# Patient Record
Sex: MALE | Race: WHITE | NOT HISPANIC OR LATINO | Employment: FULL TIME | URBAN - METROPOLITAN AREA
[De-identification: names, ages, dates, MRNs, and addresses within clinical notes are randomized per-mention and may not be internally consistent; named-entity substitution may affect disease eponyms.]

---

## 2025-01-02 ENCOUNTER — TELEPHONE (OUTPATIENT)
Dept: PRIMARY CARE | Facility: CLINIC | Age: 60
End: 2025-01-02
Payer: COMMERCIAL

## 2025-01-02 DIAGNOSIS — E78.00 HYPERCHOLESTEROLEMIA: ICD-10-CM

## 2025-01-02 DIAGNOSIS — I10 HYPERTENSION, UNSPECIFIED TYPE: ICD-10-CM

## 2025-01-03 PROBLEM — E78.00 HYPERCHOLESTEROLEMIA: Status: ACTIVE | Noted: 2020-09-16

## 2025-01-03 PROBLEM — I10 HYPERTENSION: Status: ACTIVE | Noted: 2019-07-15

## 2025-01-08 ENCOUNTER — LAB (OUTPATIENT)
Dept: LAB | Facility: LAB | Age: 60
End: 2025-01-08
Payer: COMMERCIAL

## 2025-01-08 ENCOUNTER — APPOINTMENT (OUTPATIENT)
Dept: PRIMARY CARE | Facility: CLINIC | Age: 60
End: 2025-01-08
Payer: COMMERCIAL

## 2025-01-08 VITALS
BODY MASS INDEX: 34.21 KG/M2 | DIASTOLIC BLOOD PRESSURE: 84 MMHG | SYSTOLIC BLOOD PRESSURE: 142 MMHG | HEIGHT: 69 IN | WEIGHT: 231 LBS

## 2025-01-08 DIAGNOSIS — R42 DIZZINESS: Primary | ICD-10-CM

## 2025-01-08 DIAGNOSIS — R73.03 PRE-DIABETES: ICD-10-CM

## 2025-01-08 DIAGNOSIS — R07.9 CHEST PAIN, UNSPECIFIED TYPE: ICD-10-CM

## 2025-01-08 DIAGNOSIS — R63.5 WEIGHT GAIN: ICD-10-CM

## 2025-01-08 DIAGNOSIS — E78.00 HYPERCHOLESTEROLEMIA: ICD-10-CM

## 2025-01-08 DIAGNOSIS — I10 HYPERTENSION, UNSPECIFIED TYPE: ICD-10-CM

## 2025-01-08 LAB
ALBUMIN SERPL BCP-MCNC: 4.8 G/DL (ref 3.4–5)
ALP SERPL-CCNC: 70 U/L (ref 33–120)
ALT SERPL W P-5'-P-CCNC: 19 U/L (ref 10–52)
ANION GAP SERPL CALCULATED.3IONS-SCNC: 9 MMOL/L (ref 10–20)
APPEARANCE UR: CLEAR
AST SERPL W P-5'-P-CCNC: 19 U/L (ref 9–39)
BILIRUB SERPL-MCNC: 0.5 MG/DL (ref 0–1.2)
BILIRUB UR STRIP.AUTO-MCNC: NEGATIVE MG/DL
BUN SERPL-MCNC: 14 MG/DL (ref 6–23)
CALCIUM SERPL-MCNC: 9.4 MG/DL (ref 8.6–10.3)
CHLORIDE SERPL-SCNC: 104 MMOL/L (ref 98–107)
CHOLEST SERPL-MCNC: 214 MG/DL (ref 0–199)
CHOLEST/HDLC SERPL: 4 {RATIO}
CO2 SERPL-SCNC: 29 MMOL/L (ref 21–32)
COLOR UR: NORMAL
CREAT SERPL-MCNC: 1.07 MG/DL (ref 0.5–1.3)
EGFRCR SERPLBLD CKD-EPI 2021: 80 ML/MIN/1.73M*2
ERYTHROCYTE [DISTWIDTH] IN BLOOD BY AUTOMATED COUNT: 14.9 % (ref 11.5–14.5)
GLUCOSE SERPL-MCNC: 92 MG/DL (ref 74–99)
GLUCOSE UR STRIP.AUTO-MCNC: NORMAL MG/DL
HCT VFR BLD AUTO: 40.2 % (ref 41–52)
HDLC SERPL-MCNC: 52.9 MG/DL
HGB BLD-MCNC: 12.8 G/DL (ref 13.5–17.5)
KETONES UR STRIP.AUTO-MCNC: NEGATIVE MG/DL
LDLC SERPL CALC-MCNC: 142 MG/DL
LEUKOCYTE ESTERASE UR QL STRIP.AUTO: NEGATIVE
MCH RBC QN AUTO: 27.2 PG (ref 26–34)
MCHC RBC AUTO-ENTMCNC: 31.8 G/DL (ref 32–36)
MCV RBC AUTO: 85 FL (ref 80–100)
NITRITE UR QL STRIP.AUTO: NEGATIVE
NON HDL CHOLESTEROL: 161 MG/DL (ref 0–149)
NRBC BLD-RTO: 0 /100 WBCS (ref 0–0)
PH UR STRIP.AUTO: 5.5 [PH]
PLATELET # BLD AUTO: 295 X10*3/UL (ref 150–450)
POTASSIUM SERPL-SCNC: 4.2 MMOL/L (ref 3.5–5.3)
PROT SERPL-MCNC: 7.2 G/DL (ref 6.4–8.2)
PROT UR STRIP.AUTO-MCNC: NEGATIVE MG/DL
RBC # BLD AUTO: 4.71 X10*6/UL (ref 4.5–5.9)
RBC # UR STRIP.AUTO: NEGATIVE /UL
SODIUM SERPL-SCNC: 138 MMOL/L (ref 136–145)
SP GR UR STRIP.AUTO: 1.02
TRIGL SERPL-MCNC: 96 MG/DL (ref 0–149)
UROBILINOGEN UR STRIP.AUTO-MCNC: NORMAL MG/DL
VLDL: 19 MG/DL (ref 0–40)
WBC # BLD AUTO: 6 X10*3/UL (ref 4.4–11.3)

## 2025-01-08 PROCEDURE — 83036 HEMOGLOBIN GLYCOSYLATED A1C: CPT

## 2025-01-08 PROCEDURE — 3077F SYST BP >= 140 MM HG: CPT | Performed by: INTERNAL MEDICINE

## 2025-01-08 PROCEDURE — 99204 OFFICE O/P NEW MOD 45 MIN: CPT | Performed by: INTERNAL MEDICINE

## 2025-01-08 PROCEDURE — 80061 LIPID PANEL: CPT

## 2025-01-08 PROCEDURE — 84443 ASSAY THYROID STIM HORMONE: CPT

## 2025-01-08 PROCEDURE — 3079F DIAST BP 80-89 MM HG: CPT | Performed by: INTERNAL MEDICINE

## 2025-01-08 PROCEDURE — 81003 URINALYSIS AUTO W/O SCOPE: CPT

## 2025-01-08 PROCEDURE — 3008F BODY MASS INDEX DOCD: CPT | Performed by: INTERNAL MEDICINE

## 2025-01-08 PROCEDURE — 85027 COMPLETE CBC AUTOMATED: CPT

## 2025-01-08 PROCEDURE — 80053 COMPREHEN METABOLIC PANEL: CPT

## 2025-01-08 RX ORDER — IBUPROFEN 600 MG/1
TABLET ORAL
COMMUNITY
Start: 2022-06-28

## 2025-01-08 ASSESSMENT — ENCOUNTER SYMPTOMS
DEPRESSION: 0
LOSS OF SENSATION IN FEET: 0
OCCASIONAL FEELINGS OF UNSTEADINESS: 0

## 2025-01-09 LAB
EST. AVERAGE GLUCOSE BLD GHB EST-MCNC: 111 MG/DL
HBA1C MFR BLD: 5.5 %
TSH SERPL-ACNC: 2.06 MIU/L (ref 0.44–3.98)

## 2025-01-09 NOTE — PROGRESS NOTES
"Subjective   Patient ID: Golden Goldman is a 59 y.o. male who presents for Establish Care.    1. This 59-year-old young man today came to my office first time.  He told me that he is dizzy on and off.  There is a positional component to it.  If he stands up quickly, he also gets dizzy.  2. He gets chest pain, palpitation at times.  He thinks he has a cardiac arrhythmia, misses the beat.  He is concerned.  He works very hard as a marcial.    I have personally reviewed the patient's Past Medical History, Medications, Allergies, Social History, and Family History in the EMR.    IMMUNIZATION: I will check tetanus shot.    OPERATION: Lipoma fat lump removed, it came back.    Review of Systems   All other systems reviewed and are negative.  The patient never had a heart attack.  No stroke.  No diabetes.  No cancer.    Objective   /84   Ht 1.753 m (5' 9\")   Wt 105 kg (231 lb)   BMI 34.11 kg/m²     Physical Exam  Vitals reviewed.   HENT:      Right Ear: Tympanic membrane, ear canal and external ear normal.      Left Ear: Tympanic membrane, ear canal and external ear normal.   Eyes:      General: No scleral icterus.     Pupils: Pupils are equal, round, and reactive to light.   Neck:      Vascular: No carotid bruit.   Cardiovascular:      Heart sounds: Normal heart sounds, S1 normal and S2 normal. No murmur heard.     No friction rub.   Pulmonary:      Effort: Pulmonary effort is normal.      Breath sounds: Normal breath sounds and air entry.   Abdominal:      Palpations: There is no hepatomegaly, splenomegaly or mass.   Musculoskeletal:         General: No swelling or deformity. Normal range of motion.      Cervical back: Neck supple.      Right lower leg: No edema.      Left lower leg: No edema.   Lymphadenopathy:      Cervical: No cervical adenopathy.      Upper Body:      Right upper body: No axillary adenopathy.      Left upper body: No axillary adenopathy.      Lower Body: No right inguinal adenopathy. No " left inguinal adenopathy.   Neurological:      Mental Status: He is oriented to person, place, and time.      Cranial Nerves: Cranial nerves 2-12 are intact. No cranial nerve deficit.      Sensory: No sensory deficit.      Motor: Motor function is intact. No weakness.      Gait: Gait is intact.      Deep Tendon Reflexes: Reflexes normal.      Comments: Some Hallpike's maneuver positive.   Psychiatric:         Mood and Affect: Mood normal. Mood is not anxious or depressed. Affect is not angry.         Behavior: Behavior is not agitated.         Thought Content: Thought content normal.         Judgment: Judgment normal.     LAB WORK: Laboratory testing discussed.    Assessment/Plan   Problem List Items Addressed This Visit             ICD-10-CM       Cardiac and Vasculature    Hypercholesterolemia E78.00    Relevant Orders    Comprehensive Metabolic Panel (Completed)    Lipid Panel (Completed)    Hypertension I10    Relevant Orders    CBC (Completed)    Thyroid Stimulating Hormone    Urinalysis with Reflex Microscopic (Completed)     Other Visit Diagnoses         Codes    Dizziness    -  Primary R42    Chest pain, unspecified type     R07.9    Relevant Orders    Holter or Event Cardiac Monitor    Transthoracic Echo Complete    Pre-diabetes     R73.03    Relevant Orders    Hemoglobin A1C    Weight gain     R63.5        1. Dizziness, vestibular disease.  Monitor.  2. Weight gain.  Complete blood work ordered.  3. Chest pain, palpitation.  I ordered thyroid, blood work.  Holter and echo ordered.  4. BPH.  PSA.  5. Family history of type 2 diabetes and hypertension.  Blood work ordered.  6. I shall see him back in a couple of weeks for a physical.  7. Immunization.  We will monitor tetanus shot.  8. I will continue to follow.  9. Follow-up with me in two weeks.    Scribe Attestation  By signing my name below, Felecia RUSSELL Scribe attest that this documentation has been prepared under the direction and in the presence of  Boris Lacey MD.     All medical record entries made by the scribe were personally dictated by me I have reviewed the chart and agree the record accurately reflects my personal performance of his history physical examination and management

## 2025-01-16 ENCOUNTER — HOSPITAL ENCOUNTER (OUTPATIENT)
Dept: CARDIOLOGY | Facility: CLINIC | Age: 60
Discharge: HOME | End: 2025-01-16
Payer: COMMERCIAL

## 2025-01-16 DIAGNOSIS — R07.9 CHEST PAIN, UNSPECIFIED TYPE: ICD-10-CM

## 2025-01-16 LAB
AORTIC VALVE PEAK VELOCITY: 1.53 M/S
AV PEAK GRADIENT: 9 MMHG
AVA (PEAK VEL): 3.12 CM2
EJECTION FRACTION APICAL 4 CHAMBER: 66.5
EJECTION FRACTION: 58 %
LEFT ATRIUM VOLUME AREA LENGTH INDEX BSA: 27.7 ML/M2
LEFT VENTRICLE INTERNAL DIMENSION DIASTOLE: 5.1 CM (ref 3.5–6)
LEFT VENTRICULAR OUTFLOW TRACT DIAMETER: 2.2 CM
MITRAL VALVE E/A RATIO: 0.83
RIGHT VENTRICLE FREE WALL PEAK S': 15 CM/S
RIGHT VENTRICLE PEAK SYSTOLIC PRESSURE: 22.4 MMHG
TRICUSPID ANNULAR PLANE SYSTOLIC EXCURSION: 2.1 CM

## 2025-01-16 PROCEDURE — 93246 EXT ECG>7D<15D RECORDING: CPT

## 2025-01-16 PROCEDURE — 93306 TTE W/DOPPLER COMPLETE: CPT | Performed by: INTERNAL MEDICINE

## 2025-01-16 PROCEDURE — 93306 TTE W/DOPPLER COMPLETE: CPT

## 2025-01-22 ENCOUNTER — APPOINTMENT (OUTPATIENT)
Dept: PRIMARY CARE | Facility: CLINIC | Age: 60
End: 2025-01-22
Payer: COMMERCIAL

## 2025-01-23 ENCOUNTER — APPOINTMENT (OUTPATIENT)
Dept: PRIMARY CARE | Facility: CLINIC | Age: 60
End: 2025-01-23
Payer: COMMERCIAL

## 2025-01-23 VITALS
SYSTOLIC BLOOD PRESSURE: 128 MMHG | DIASTOLIC BLOOD PRESSURE: 70 MMHG | WEIGHT: 233 LBS | BODY MASS INDEX: 34.51 KG/M2 | HEIGHT: 69 IN

## 2025-01-23 DIAGNOSIS — Z76.89 ENCOUNTER FOR SKIN CARE: ICD-10-CM

## 2025-01-23 DIAGNOSIS — Z12.5 ENCOUNTER FOR PROSTATE CANCER SCREENING: ICD-10-CM

## 2025-01-23 DIAGNOSIS — Z00.00 PHYSICAL EXAM: Primary | ICD-10-CM

## 2025-01-23 DIAGNOSIS — Z00.00 HEALTHCARE MAINTENANCE: ICD-10-CM

## 2025-01-23 DIAGNOSIS — D64.9 ANEMIA, UNSPECIFIED TYPE: ICD-10-CM

## 2025-01-23 ASSESSMENT — ENCOUNTER SYMPTOMS
LOSS OF SENSATION IN FEET: 0
DEPRESSION: 0
OCCASIONAL FEELINGS OF UNSTEADINESS: 0

## 2025-01-24 ENCOUNTER — HOSPITAL ENCOUNTER (OUTPATIENT)
Dept: RADIOLOGY | Facility: HOSPITAL | Age: 60
Discharge: HOME | End: 2025-01-24
Payer: COMMERCIAL

## 2025-01-24 ENCOUNTER — LAB (OUTPATIENT)
Dept: LAB | Facility: LAB | Age: 60
End: 2025-01-24
Payer: COMMERCIAL

## 2025-01-24 DIAGNOSIS — Z00.00 HEALTHCARE MAINTENANCE: ICD-10-CM

## 2025-01-24 DIAGNOSIS — Z12.5 ENCOUNTER FOR PROSTATE CANCER SCREENING: ICD-10-CM

## 2025-01-24 PROCEDURE — 75571 CT HRT W/O DYE W/CA TEST: CPT

## 2025-01-24 PROCEDURE — 84153 ASSAY OF PSA TOTAL: CPT

## 2025-01-24 NOTE — PROGRESS NOTES
"Subjective   Patient ID: Golden Goldman is a 59 y.o. male who presents for Annual Exam.    This 59-year-old young man today came here for physical.  He understands it is covered benefit.  Overall, okay.  He updated me he had his colonoscopy done.  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  He came for follow-up on various conditions.  Appetite and weight are okay.  No problem.    IMMUNIZATION: Tetanus shot, he is due in 2027.    I have personally reviewed the patient's Past Medical History, Medications, Allergies, Social History, and Family History in the EMR.    Review of Systems   All other systems reviewed and are negative.  The patient never had heart attack, stroke, diabetes.    Objective   /70   Ht 1.753 m (5' 9\")   Wt 106 kg (233 lb)   BMI 34.41 kg/m²     Physical Exam  Vitals reviewed.   HENT:      Right Ear: Tympanic membrane, ear canal and external ear normal.      Left Ear: Tympanic membrane, ear canal and external ear normal.   Eyes:      General: No scleral icterus.     Pupils: Pupils are equal, round, and reactive to light.   Neck:      Vascular: No carotid bruit.   Cardiovascular:      Heart sounds: Normal heart sounds, S1 normal and S2 normal. No murmur heard.     No friction rub.   Pulmonary:      Effort: Pulmonary effort is normal.      Breath sounds: Normal breath sounds and air entry.   Abdominal:      Palpations: There is no hepatomegaly, splenomegaly or mass.   Genitourinary:     Prostate: Normal.   Musculoskeletal:         General: No swelling or deformity. Normal range of motion.      Cervical back: Neck supple.      Right lower leg: No edema.      Left lower leg: No edema.   Lymphadenopathy:      Cervical: No cervical adenopathy.      Upper Body:      Right upper body: No axillary adenopathy.      Left upper body: No axillary adenopathy.      Lower Body: No right inguinal adenopathy. No left inguinal adenopathy.   Skin:     Comments: Moles and " zen.   Neurological:      Mental Status: He is oriented to person, place, and time.      Cranial Nerves: Cranial nerves 2-12 are intact. No cranial nerve deficit.      Sensory: No sensory deficit.      Motor: Motor function is intact. No weakness.      Gait: Gait is intact.      Deep Tendon Reflexes: Reflexes normal.   Psychiatric:         Mood and Affect: Mood normal. Mood is not anxious or depressed. Affect is not angry.         Behavior: Behavior is not agitated.         Thought Content: Thought content normal.         Judgment: Judgment normal.     LAB WORK: Laboratory testing discussed.    Assessment/Plan   Problem List Items Addressed This Visit    None  Visit Diagnoses         Codes    Physical exam    -  Primary Z00.00    Anemia, unspecified type     D64.9    Relevant Orders    Referral To Hematology and Oncology    Encounter for prostate cancer screening     Z12.5    Relevant Orders    Prostate Specific Antigen, Screen    Encounter for skin care     Z76.89    Relevant Orders    Referral to Dermatology    Healthcare maintenance     Z00.00    Relevant Orders    CT cardiac scoring wo IV contrast        1. Essentially normal physical.  2. PSA not drawn, ordered.  3. Colonoscopy completed.  4. Cardiac calcium score ordered.  5. Skin, skin doctor.  6. Anemia.  I referred to Hematology/Oncology.  7. I shall see him back in about three months for repeat tests.  8. Continue to follow.  9. Welcome back to my office.    Scribe Attestation  By signing my name below, IFelecia, Fatimah attest that this documentation has been prepared under the direction and in the presence of Boris Lacey MD.

## 2025-01-25 LAB — PSA SERPL-MCNC: 1.51 NG/ML

## 2025-02-04 ENCOUNTER — APPOINTMENT (OUTPATIENT)
Dept: HEMATOLOGY/ONCOLOGY | Facility: CLINIC | Age: 60
End: 2025-02-04
Payer: COMMERCIAL

## 2025-02-10 ENCOUNTER — APPOINTMENT (OUTPATIENT)
Dept: PRIMARY CARE | Facility: CLINIC | Age: 60
End: 2025-02-10
Payer: COMMERCIAL

## 2025-02-10 VITALS
HEIGHT: 69 IN | DIASTOLIC BLOOD PRESSURE: 80 MMHG | BODY MASS INDEX: 35.25 KG/M2 | WEIGHT: 238 LBS | SYSTOLIC BLOOD PRESSURE: 138 MMHG

## 2025-02-10 DIAGNOSIS — E78.00 HIGH CHOLESTEROL: ICD-10-CM

## 2025-02-10 DIAGNOSIS — R52 PAIN: ICD-10-CM

## 2025-02-10 DIAGNOSIS — R93.1 ABNORMAL HEART SCORE CT: ICD-10-CM

## 2025-02-10 DIAGNOSIS — R93.1 ELEVATED CORONARY ARTERY CALCIUM SCORE: ICD-10-CM

## 2025-02-10 DIAGNOSIS — L81.2 FRECKLES: ICD-10-CM

## 2025-02-10 DIAGNOSIS — R06.83 SNORING: Primary | ICD-10-CM

## 2025-02-10 DIAGNOSIS — D22.9 BENIGN MOLE: ICD-10-CM

## 2025-02-10 DIAGNOSIS — E66.3 OVERWEIGHT: ICD-10-CM

## 2025-02-10 PROCEDURE — RXMED WILLOW AMBULATORY MEDICATION CHARGE

## 2025-02-10 PROCEDURE — 3075F SYST BP GE 130 - 139MM HG: CPT | Performed by: INTERNAL MEDICINE

## 2025-02-10 PROCEDURE — 3008F BODY MASS INDEX DOCD: CPT | Performed by: INTERNAL MEDICINE

## 2025-02-10 PROCEDURE — 99214 OFFICE O/P EST MOD 30 MIN: CPT | Performed by: INTERNAL MEDICINE

## 2025-02-10 PROCEDURE — 3079F DIAST BP 80-89 MM HG: CPT | Performed by: INTERNAL MEDICINE

## 2025-02-10 RX ORDER — IBUPROFEN 600 MG/1
600 TABLET ORAL 3 TIMES DAILY
Qty: 90 TABLET | Refills: 1 | Status: SHIPPED | OUTPATIENT
Start: 2025-02-10 | End: 2025-03-13

## 2025-02-10 ASSESSMENT — ENCOUNTER SYMPTOMS
DEPRESSION: 0
OCCASIONAL FEELINGS OF UNSTEADINESS: 0
LOSS OF SENSATION IN FEET: 0

## 2025-02-11 ENCOUNTER — PHARMACY VISIT (OUTPATIENT)
Dept: PHARMACY | Facility: CLINIC | Age: 60
End: 2025-02-11
Payer: COMMERCIAL

## 2025-02-11 NOTE — PROGRESS NOTES
"Subjective   Patient ID: Golden Goldman is a 59 y.o. male who presents for Snoring.    Golden Goldman today came here for multiple medical issues.  1. He told me he snores at night, difficulty falling asleep, and increased daytime somnolence.  2. He had a blood work and calcium score.  He is waiting to see cardiologist.    I have personally reviewed the patient's Past Medical History, Medications, Allergies, Social History, and Family History in the EMR.    Review of Systems   All other systems reviewed and are negative.    Objective   /80   Ht 1.753 m (5' 9\")   Wt 108 kg (238 lb)   BMI 35.15 kg/m²     Physical Exam  Vitals reviewed.   Cardiovascular:      Heart sounds: Normal heart sounds, S1 normal and S2 normal. No murmur heard.     No friction rub.   Pulmonary:      Effort: Pulmonary effort is normal.      Breath sounds: Normal breath sounds and air entry.   Abdominal:      Palpations: There is no hepatomegaly, splenomegaly or mass.   Musculoskeletal:      Right lower leg: No edema.      Left lower leg: No edema.   Lymphadenopathy:      Lower Body: No right inguinal adenopathy. No left inguinal adenopathy.   Skin:     Comments: Moles and freckles.   Neurological:      Cranial Nerves: Cranial nerves 2-12 are intact.      Sensory: No sensory deficit.      Motor: Motor function is intact.      Deep Tendon Reflexes: Reflexes are normal and symmetric.     LAB WORK:  Laboratory testing discussed.    Assessment/Plan   Problem List Items Addressed This Visit    None  Visit Diagnoses         Codes    Snoring    -  Primary R06.83    Pain     R52    Relevant Medications    ibuprofen 600 mg tablet    Abnormal Heart Score CT     R93.1    Relevant Orders    Referral to Cardiology    Elevated coronary artery calcium score     R93.1    Benign mole     D22.9    Freckles     L81.2    High cholesterol     E78.00    Overweight     E66.3        1. Possible sleep apnea, snoring.  CPAP ordered.  2. Calcium score positive. "  See Dr. Pack.  3. Skin.  Moles and freckles.  Refer to dermatologist.  4. High cholesterol.  Possible consider statin.  The patient is reluctant.  We discussed ______.  5. Overweight.  Diet and exercise.  6. I shall see him back in a couple of months.  7. Continue to follow.    Timibe Attestation  By signing my name below, I, Dianne Maldonado attest that this documentation has been prepared under the direction and in the presence of Boris Lacey MD.   All medical record entries made by the dianne were personally dictated by me I have reviewed the chart and agree the record accurately reflects my personal performance of his history physical examination and management

## 2025-02-13 ENCOUNTER — OFFICE VISIT (OUTPATIENT)
Dept: CARDIOLOGY | Facility: CLINIC | Age: 60
End: 2025-02-13
Payer: COMMERCIAL

## 2025-02-13 VITALS
HEART RATE: 74 BPM | DIASTOLIC BLOOD PRESSURE: 78 MMHG | WEIGHT: 235 LBS | BODY MASS INDEX: 34.7 KG/M2 | SYSTOLIC BLOOD PRESSURE: 142 MMHG | OXYGEN SATURATION: 95 %

## 2025-02-13 DIAGNOSIS — R93.1 ABNORMAL HEART SCORE CT: ICD-10-CM

## 2025-02-13 DIAGNOSIS — I10 HYPERTENSION, UNSPECIFIED TYPE: ICD-10-CM

## 2025-02-13 DIAGNOSIS — E78.00 HYPERCHOLESTEROLEMIA: ICD-10-CM

## 2025-02-13 PROCEDURE — 99204 OFFICE O/P NEW MOD 45 MIN: CPT | Performed by: INTERNAL MEDICINE

## 2025-02-13 PROCEDURE — 99214 OFFICE O/P EST MOD 30 MIN: CPT | Performed by: INTERNAL MEDICINE

## 2025-02-13 PROCEDURE — 3077F SYST BP >= 140 MM HG: CPT | Performed by: INTERNAL MEDICINE

## 2025-02-13 PROCEDURE — 3078F DIAST BP <80 MM HG: CPT | Performed by: INTERNAL MEDICINE

## 2025-02-13 PROCEDURE — RXMED WILLOW AMBULATORY MEDICATION CHARGE

## 2025-02-13 RX ORDER — METOPROLOL SUCCINATE 50 MG/1
50 TABLET, EXTENDED RELEASE ORAL DAILY
Qty: 90 TABLET | Refills: 3 | Status: SHIPPED | OUTPATIENT
Start: 2025-02-13 | End: 2026-02-13

## 2025-02-13 RX ORDER — ROSUVASTATIN CALCIUM 10 MG/1
10 TABLET, COATED ORAL DAILY
Qty: 90 TABLET | Refills: 3 | Status: SHIPPED | OUTPATIENT
Start: 2025-02-13 | End: 2026-02-13

## 2025-02-13 NOTE — PROGRESS NOTES
Primary Care Physician: Boris Lacey MD  Date of Visit: 02/13/2025  1:00 PM EST  Location of visit: [address]     Chief Complaint: No chief complaint on file.    HPI / Summary:   Golden Goldman is a 59 y.o. male presents for new patient    ROS    Medical History:   He has a past medical history of Anemia.  Surgical Hx:   He has a past surgical history that includes Other surgical history and Hernia repair.   Social Hx:   He reports that he has never smoked. He does not have any smokeless tobacco history on file. Drug use questions deferred to the physician. He reports that he does not drink alcohol.  Family Hx:   His family history includes Coronary artery disease in his mother; Diabetes in his mother; Hyperlipidemia in his mother; blood pressure in his father.   Allergies:  No Known Allergies  Outpatient Medications:  Current Outpatient Medications   Medication Instructions    ibuprofen 600 mg, oral, 3 times daily    metoprolol succinate XL (TOPROL-XL) 50 mg, oral, Daily, Do not crush or chew.    rosuvastatin (CRESTOR) 10 mg, oral, Daily     Physical Exam:  Vitals:    02/13/25 1320 02/13/25 1410   BP: 146/87 142/78   Pulse: 70 74   SpO2: 95%    Weight: 107 kg (235 lb)      Wt Readings from Last 5 Encounters:   02/13/25 107 kg (235 lb)   02/10/25 108 kg (238 lb)   01/23/25 106 kg (233 lb)   01/08/25 105 kg (231 lb)   09/25/23 102 kg (225 lb 1.4 oz)     Physical Exam  JVP not elevated. Carotid impulses are 2+ without overlying bruit.   Chest exhibits fair to good air movement with completely clear breath sounds.   The cardiac rhythm is regular with no premature beats.   Normal S1 and S2. No gallop, murmur or rub, or click.   Abdomen is soft and benign without focal tenderness.   With no lower leg edema. The pedal pulses are intact.     Last Labs:  Lab on 01/24/2025   Component Date Value    Prostate Specific Antige* 01/24/2025 1.51    Hospital Outpatient Visit on 01/16/2025   Component Date Value    AV  pk karen 01/16/2025 1.53     LVOT diam 01/16/2025 2.20     MV E/A ratio 01/16/2025 0.83     LA vol index A/L 01/16/2025 27.7     Tricuspid annular plane * 01/16/2025 2.1     LV EF 01/16/2025 58     RV free wall pk S' 01/16/2025 15.00     LVIDd 01/16/2025 5.10     RVSP 01/16/2025 22.4     Aortic Valve Area by Con* 01/16/2025 3.12     AV pk grad 01/16/2025 9     LV A4C EF 01/16/2025 66.5    Lab on 01/08/2025   Component Date Value    WBC 01/08/2025 6.0     nRBC 01/08/2025 0.0     RBC 01/08/2025 4.71     Hemoglobin 01/08/2025 12.8 (L)     Hematocrit 01/08/2025 40.2 (L)     MCV 01/08/2025 85     MCH 01/08/2025 27.2     MCHC 01/08/2025 31.8 (L)     RDW 01/08/2025 14.9 (H)     Platelets 01/08/2025 295     Glucose 01/08/2025 92     Sodium 01/08/2025 138     Potassium 01/08/2025 4.2     Chloride 01/08/2025 104     Bicarbonate 01/08/2025 29     Anion Gap 01/08/2025 9 (L)     Urea Nitrogen 01/08/2025 14     Creatinine 01/08/2025 1.07     eGFR 01/08/2025 80     Calcium 01/08/2025 9.4     Albumin 01/08/2025 4.8     Alkaline Phosphatase 01/08/2025 70     Total Protein 01/08/2025 7.2     AST 01/08/2025 19     Bilirubin, Total 01/08/2025 0.5     ALT 01/08/2025 19     Cholesterol 01/08/2025 214 (H)     HDL-Cholesterol 01/08/2025 52.9     Cholesterol/HDL Ratio 01/08/2025 4.0     LDL Calculated 01/08/2025 142 (H)     VLDL 01/08/2025 19     Triglycerides 01/08/2025 96     Non HDL Cholesterol 01/08/2025 161 (H)     Hemoglobin A1C 01/08/2025 5.5     Estimated Average Glucose 01/08/2025 111     Thyroid Stimulating Horm* 01/08/2025 2.06     Color, Urine 01/08/2025 Light-Yellow     Appearance, Urine 01/08/2025 Clear     Specific Gravity, Urine 01/08/2025 1.019     pH, Urine 01/08/2025 5.5     Protein, Urine 01/08/2025 NEGATIVE     Glucose, Urine 01/08/2025 Normal     Blood, Urine 01/08/2025 NEGATIVE     Ketones, Urine 01/08/2025 NEGATIVE     Bilirubin, Urine 01/08/2025 NEGATIVE     Urobilinogen, Urine 01/08/2025 Normal     Nitrite, Urine  01/08/2025 NEGATIVE     Leukocyte Esterase, Urine 01/08/2025 NEGATIVE         Assessment/Plan   1.  Not hypertensive.  2.  Nondiabetic.  3.  Mild hyperlipidemia.  The patient did have a lipid panel performed 20 2025 with cholesterol 214 HDL 52  triglyceride 96.  The patient does however have a slightly elevated CT calcium score and he will be started on rosuvastatin 10 mg daily.  4.  Coronary artery disease.  This patient did have a CT coronary calcium score performed 1/24/2025 with a value of only 44 placing him at low risk.  He is asymptomatic and as such we will defer surveillance stress testing.  He will however be started on the rosuvastatin 10 mg daily as noted above along with Toprol-XL 50 mg daily.  The patient was noted on the CT coronary calcium score to have a dilated ascending thoracic aorta 4.3 cm.  A echocardiogram on 1/16/2025 showed a preserved LV ejection fraction of 55 to 60% with only mild dilatation of the ascending thoracic aorta measuring 4.0 cm.  Will monitor by serial echoes.  Next line 5.  Non-smoker.  6.  Palpitations.  This patient did wear Holter monitor 1/16/2025 - 1/30/2025 showing sinus rhythm averaging 70/min minimal rate 49/min.  The patient had only 31 PVCs and 83 PACs less than 1% of record with 2 episodes of SVT longest being 7 beats.  The total duration of monitored rhythm was actually 7 hours and 47 minutes.  Patient will be started on Toprol-XL 50 mg daily to back primarily because of his coronary artery disease.  7.  History of anxiety with panic attacks.  Patient has had 4 panic attacks with hyperventilation over the past 1 year.  8.  Status post hernia repair  9.  Status post excision of lipoma.        Orders:  No orders of the defined types were placed in this encounter.     Followup Appts:  Future Appointments   Date Time Provider Department Center   2/21/2025  8:45 AM MADHU Mcghee [id number] [facility]   3/19/2025 11:00 AM MADHU Cintron  [id number] [facility]   7/17/2025  1:45 PM Nik Pack MD [id number] [facility]   8/7/2025  1:30 PM Kip Franklin MD [id number] [facility]           ____________________________________________________________  Nik Pack MD  [facility] [place]  Assistant Clinical Professor, [employer]  [facility]

## 2025-02-21 ENCOUNTER — PHARMACY VISIT (OUTPATIENT)
Dept: PHARMACY | Facility: CLINIC | Age: 60
End: 2025-02-21
Payer: COMMERCIAL

## 2025-02-21 ENCOUNTER — APPOINTMENT (OUTPATIENT)
Dept: DERMATOLOGY | Facility: CLINIC | Age: 60
End: 2025-02-21
Payer: COMMERCIAL

## 2025-02-21 DIAGNOSIS — D22.9 BENIGN NEVUS: Primary | ICD-10-CM

## 2025-02-21 DIAGNOSIS — L57.9 SKIN CHANGES DUE TO CHRONIC EXPOSURE TO NONIONIZING RADIATION: ICD-10-CM

## 2025-02-21 DIAGNOSIS — L81.4 LENTIGO: ICD-10-CM

## 2025-02-21 PROCEDURE — 99203 OFFICE O/P NEW LOW 30 MIN: CPT | Performed by: NURSE PRACTITIONER

## 2025-02-21 NOTE — PROGRESS NOTES
Subjective     Golden Goldman is a 59 y.o. male who presents for the following: Skin Check.     Review of Systems:  No other skin or systemic complaints other than what is documented elsewhere in the note.    The following portions of the chart were reviewed this encounter and updated as appropriate:   Tobacco  Allergies  Meds  Problems  Med Hx  Surg Hx         Skin Cancer History  No skin cancer on file.      Specialty Problems    None       Objective   Well appearing patient in no apparent distress; mood and affect are within normal limits.    A focused skin examination was performed waist up and legs. All findings within normal limits unless otherwise noted below.    Assessment/Plan   1. Benign nevus  Scattered, uniform and benign-appearing, regular brown melanocytic papules and macules.    The present appearance of the lesion is not worrisome but it should continue to be observed and testing/treatment may be warranted if change occurs.    2. Lentigo  Scattered tan macules in sun-exposed areas.    A solar lentigo (plural, solar lentigines), also known as a sun-induced freckle or senile lentigo, is a dark (hyperpigmented) lesion caused by natural or artificial ultraviolet (UV) light. Solar lentigines may be single or multiple. This type of lentigo is different from a simple lentigo (lentigo simplex) because it is caused by exposure to UV light. Solar lentigines are benign, but they do indicate excessive sun exposure, a risk factor for the development of skin cancer.    To prevent solar lentigines, avoid exposure to sunlight in midday (10 AM to 3 PM), wear sun-protective clothing (tightly woven clothes and hats), and apply sunscreen (SPF 30 UVA and UVB block).    The present appearance of the lesion is not worrisome but it should continue to be observed and testing/treatment may be warranted if change occurs.    3. Skin changes due to chronic exposure to nonionizing radiation  Actinic changes in the form  of freckles, lentigines and hyper/hypopigmentation     ABCDEs of melanoma and atypical moles were discussed with the patient.    Patient was instructed to perform monthly self skin examination.  We recommended that the patient have regular full skin exams given an increased risk of subsequent skin cancers.    The patient was instructed to use sun protective behaviors including use of broad spectrum sunscreens and sun protective clothing to reduce risk of skin cancers.    Warning signs of non-melanoma skin cancer discussed.        Return to clinic in 2 years for skin check/follow up or sooner if needed

## 2025-03-19 ENCOUNTER — APPOINTMENT (OUTPATIENT)
Dept: HEMATOLOGY/ONCOLOGY | Facility: CLINIC | Age: 60
End: 2025-03-19
Payer: COMMERCIAL

## 2025-03-19 VITALS
RESPIRATION RATE: 17 BRPM | WEIGHT: 226.74 LBS | HEIGHT: 69 IN | DIASTOLIC BLOOD PRESSURE: 93 MMHG | OXYGEN SATURATION: 98 % | TEMPERATURE: 97.7 F | SYSTOLIC BLOOD PRESSURE: 155 MMHG | HEART RATE: 66 BPM | BODY MASS INDEX: 33.58 KG/M2

## 2025-03-19 DIAGNOSIS — D64.9 ANEMIA, UNSPECIFIED TYPE: ICD-10-CM

## 2025-03-19 LAB
ALBUMIN SERPL BCP-MCNC: 4.4 G/DL (ref 3.4–5)
ALP SERPL-CCNC: 71 U/L (ref 33–120)
ALT SERPL W P-5'-P-CCNC: 18 U/L (ref 10–52)
ANION GAP SERPL CALC-SCNC: 12 MMOL/L (ref 10–20)
AST SERPL W P-5'-P-CCNC: 20 U/L (ref 9–39)
BASOPHILS # BLD AUTO: 0.04 X10*3/UL (ref 0–0.1)
BASOPHILS NFR BLD AUTO: 0.8 %
BILIRUB SERPL-MCNC: 0.6 MG/DL (ref 0–1.2)
BUN SERPL-MCNC: 14 MG/DL (ref 6–23)
CALCIUM SERPL-MCNC: 9.2 MG/DL (ref 8.6–10.3)
CHLORIDE SERPL-SCNC: 103 MMOL/L (ref 98–107)
CO2 SERPL-SCNC: 25 MMOL/L (ref 21–32)
CREAT SERPL-MCNC: 1.03 MG/DL (ref 0.5–1.3)
CRP SERPL-MCNC: 0.68 MG/DL
DAT-POLYSPECIFIC: NORMAL
EGFRCR SERPLBLD CKD-EPI 2021: 84 ML/MIN/1.73M*2
EOSINOPHIL # BLD AUTO: 0.6 X10*3/UL (ref 0–0.7)
EOSINOPHIL NFR BLD AUTO: 11.4 %
ERYTHROCYTE [DISTWIDTH] IN BLOOD BY AUTOMATED COUNT: 17 % (ref 11.5–14.5)
ERYTHROCYTE [SEDIMENTATION RATE] IN BLOOD BY WESTERGREN METHOD: 9 MM/H (ref 0–20)
FERRITIN SERPL-MCNC: 22 NG/ML (ref 20–300)
FOLATE SERPL-MCNC: 21.2 NG/ML
GLUCOSE SERPL-MCNC: 93 MG/DL (ref 74–99)
HAPTOGLOB SERPL NEPH-MCNC: 120 MG/DL (ref 30–200)
HCT VFR BLD AUTO: 40.8 % (ref 41–52)
HGB BLD-MCNC: 13.6 G/DL (ref 13.5–17.5)
HGB RETIC QN: 32 PG (ref 28–38)
IGA SERPL-MCNC: 198 MG/DL (ref 70–400)
IGG SERPL-MCNC: 931 MG/DL (ref 700–1600)
IGM SERPL-MCNC: 42 MG/DL (ref 40–230)
IMM GRANULOCYTES # BLD AUTO: 0 X10*3/UL (ref 0–0.7)
IMM GRANULOCYTES NFR BLD AUTO: 0 % (ref 0–0.9)
IMMATURE RETIC FRACTION: 9.2 %
IRON SATN MFR SERPL: 32 % (ref 25–45)
IRON SERPL-MCNC: 139 UG/DL (ref 35–150)
LDH SERPL L TO P-CCNC: 196 U/L (ref 84–246)
LYMPHOCYTES # BLD AUTO: 1.51 X10*3/UL (ref 1.2–4.8)
LYMPHOCYTES NFR BLD AUTO: 28.6 %
MCH RBC QN AUTO: 28.2 PG (ref 26–34)
MCHC RBC AUTO-ENTMCNC: 33.3 G/DL (ref 32–36)
MCV RBC AUTO: 85 FL (ref 80–100)
MONOCYTES # BLD AUTO: 0.49 X10*3/UL (ref 0.1–1)
MONOCYTES NFR BLD AUTO: 9.3 %
NEUTROPHILS # BLD AUTO: 2.64 X10*3/UL (ref 1.2–7.7)
NEUTROPHILS NFR BLD AUTO: 49.9 %
NRBC BLD-RTO: ABNORMAL /100{WBCS}
PLATELET # BLD AUTO: 216 X10*3/UL (ref 150–450)
POTASSIUM SERPL-SCNC: 4.1 MMOL/L (ref 3.5–5.3)
PROT SERPL-MCNC: 6.8 G/DL (ref 6.4–8.2)
PROT SERPL-MCNC: 6.8 G/DL (ref 6.4–8.2)
RBC # BLD AUTO: 4.83 X10*6/UL (ref 4.5–5.9)
RETICS #: 0.06 X10*6/UL (ref 0.02–0.12)
RETICS/RBC NFR AUTO: 1.2 % (ref 0.5–2)
SODIUM SERPL-SCNC: 136 MMOL/L (ref 136–145)
TIBC SERPL-MCNC: 439 UG/DL (ref 240–445)
UIBC SERPL-MCNC: 300 UG/DL (ref 110–370)
URATE SERPL-MCNC: 4.4 MG/DL (ref 4–7.5)
VIT B12 SERPL-MCNC: 301 PG/ML (ref 211–911)
WBC # BLD AUTO: 5.3 X10*3/UL (ref 4.4–11.3)

## 2025-03-19 PROCEDURE — 83550 IRON BINDING TEST: CPT

## 2025-03-19 PROCEDURE — 85652 RBC SED RATE AUTOMATED: CPT

## 2025-03-19 PROCEDURE — 83521 IG LIGHT CHAINS FREE EACH: CPT | Mod: GEALAB

## 2025-03-19 PROCEDURE — 84155 ASSAY OF PROTEIN SERUM: CPT | Mod: GEALAB

## 2025-03-19 PROCEDURE — 99215 OFFICE O/P EST HI 40 MIN: CPT | Mod: 25

## 2025-03-19 PROCEDURE — 85045 AUTOMATED RETICULOCYTE COUNT: CPT

## 2025-03-19 PROCEDURE — 86140 C-REACTIVE PROTEIN: CPT

## 2025-03-19 PROCEDURE — 99205 OFFICE O/P NEW HI 60 MIN: CPT

## 2025-03-19 PROCEDURE — 82607 VITAMIN B-12: CPT | Mod: GEALAB

## 2025-03-19 PROCEDURE — 86880 COOMBS TEST DIRECT: CPT

## 2025-03-19 PROCEDURE — 82728 ASSAY OF FERRITIN: CPT

## 2025-03-19 PROCEDURE — 3008F BODY MASS INDEX DOCD: CPT

## 2025-03-19 PROCEDURE — 83615 LACTATE (LD) (LDH) ENZYME: CPT

## 2025-03-19 PROCEDURE — 3080F DIAST BP >= 90 MM HG: CPT

## 2025-03-19 PROCEDURE — 36415 COLL VENOUS BLD VENIPUNCTURE: CPT

## 2025-03-19 PROCEDURE — 80053 COMPREHEN METABOLIC PANEL: CPT

## 2025-03-19 PROCEDURE — 84550 ASSAY OF BLOOD/URIC ACID: CPT

## 2025-03-19 PROCEDURE — 82746 ASSAY OF FOLIC ACID SERUM: CPT | Mod: GEALAB

## 2025-03-19 PROCEDURE — 3077F SYST BP >= 140 MM HG: CPT

## 2025-03-19 PROCEDURE — 85025 COMPLETE CBC W/AUTO DIFF WBC: CPT

## 2025-03-19 PROCEDURE — 82784 ASSAY IGA/IGD/IGG/IGM EACH: CPT | Mod: GEALAB

## 2025-03-19 PROCEDURE — 83010 ASSAY OF HAPTOGLOBIN QUANT: CPT | Mod: GEALAB

## 2025-03-19 RX ORDER — ASCORBIC ACID 500 MG
500 TABLET ORAL DAILY
Qty: 100 TABLET | Refills: 0 | Status: SHIPPED | OUTPATIENT
Start: 2025-03-19

## 2025-03-19 RX ORDER — ASPIRIN 81 MG/1
81 TABLET ORAL DAILY
COMMUNITY

## 2025-03-19 RX ORDER — FERROUS SULFATE 325(65) MG
325 TABLET ORAL DAILY
Qty: 30 TABLET | Refills: 2 | Status: SHIPPED | OUTPATIENT
Start: 2025-03-19

## 2025-03-19 ASSESSMENT — PATIENT HEALTH QUESTIONNAIRE - PHQ9
SUM OF ALL RESPONSES TO PHQ9 QUESTIONS 1 AND 2: 0
1. LITTLE INTEREST OR PLEASURE IN DOING THINGS: NOT AT ALL
2. FEELING DOWN, DEPRESSED OR HOPELESS: NOT AT ALL

## 2025-03-19 ASSESSMENT — ENCOUNTER SYMPTOMS
OCCASIONAL FEELINGS OF UNSTEADINESS: 0
DEPRESSION: 0
LOSS OF SENSATION IN FEET: 0

## 2025-03-19 ASSESSMENT — COLUMBIA-SUICIDE SEVERITY RATING SCALE - C-SSRS
1. IN THE PAST MONTH, HAVE YOU WISHED YOU WERE DEAD OR WISHED YOU COULD GO TO SLEEP AND NOT WAKE UP?: NO
2. HAVE YOU ACTUALLY HAD ANY THOUGHTS OF KILLING YOURSELF?: NO
6. HAVE YOU EVER DONE ANYTHING, STARTED TO DO ANYTHING, OR PREPARED TO DO ANYTHING TO END YOUR LIFE?: NO

## 2025-03-19 ASSESSMENT — PAIN SCALES - GENERAL: PAINLEVEL_OUTOF10: 2

## 2025-03-19 NOTE — PROGRESS NOTES
"[facility]    PATIENT VISIT INFORMATION    Visit Type: New Visit    Referring Provider: Boris Lacey MD  Reason for referral: Anemia  Primary Practice Provider: Boris Lacey MD    HEMATOLOGY HISTORY    59-year-old  male presents for evaluation of anemia.  Referred by primary Dr. Teixeira.  Per medical record research anemia dates back to the past 6 years.  Hemoglobin is ranging from 11-12.7.  Notable intermittent monocytosis.     Past medical history of hypertension and hypercholesteremia.  Patient has reported that he is not taking metoprolol succinate or Crestor.  He takes a baby aspirin.  Colonoscopy October 2017 small mouth diverticuli noted in sigmoid colon no other abnormalities no specimen collected and again July 2022 Notable scattered small mouth diverticuli in sigmoid colon no other abnormalities.  No specimen collected.  Recommend follow-up 10 years.  HISTORY OF PRESENT ILLNESS     ID Statement: Golden Goldman is a 59 years old male     Chief Complaint: \" feel pretty good\"     Interval History:   Golden presents with his Dorinda Ashley for evaluation of anemia.  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  He gives this frequent and as often as possible.  He has started himself on oral iron to increase his production for donation.  He does note more recent panic attacks, though not often. Saw PCP and modified his diet which has helped.  No acid reflux in at least 5 years.  He does note dizziness, lightheadedness little bit with exertion.  He has a very physical job and often does not drink enough water or other beverage during the day.  He has a history of colorectal bleeding found in stool on examination.  This prompted a colonoscopy.  He does not eat a lot of red meat.  Takes no medications other than oral iron.  Changed diet when blood pressure in cholesterol elevated.   He does note hand tingling at times, however it does resolve. "  Uses his hands a lot with work.  He is a [employer].    He quit drinking in 2007 and wife reports no more snoring.  No other complaints.  Denies F/C/recent illness/infection, drenching night sweats, unintentional weight loss, anorexia/loss of appetite, HA, PICA, acute changes in vision/hearing, palpitations, CP, SOB, n/v/d/c/abd pain, bleeding, melena, urinary issues, haematuria, LAD, peripheral neuropathy, bone pain, bruising, sores, or rashes.      PAST/CURRENT HISTORY     MEDICAL/SURGICAL HISTORY  Past Medical History:   Diagnosis Date    Anemia       Past Surgical History:   Procedure Laterality Date    HERNIA REPAIR      OTHER SURGICAL HISTORY      Fatty lump removed        SOCIAL HISTORY  -Lives with wife Sky (three healthy children)  -Work place: [employer]   -Tobacco/smokeless use: Denies   -Alcohol: Quit 2007 alcohol use   -Illicit drug or marijuana use: Denies   -B[employer] or Spiritual beliefs: Moravian   -Social Determinates of Health Concerns: NA    FAMILY HISTORY  -Dad living 83 years old prostate cancer at age 70 years old seeds  -Mom living 78 years old some sort of blood disorder, high cholesterol   -No other known history of hematologic, bleeding, clotting, autoimmune, genetic, or malignant disorders in the family.     OCCUPATIONAL/ENVIRONMENTAL HISTORY/EXPOSURES:  -None reported    Active Problems, Allergy List, Medication List, and PMH/PSH/FH/Social Hx have been reviewed and reconciled in chart. Updates made when necessary.     REVIEW OF SYSTEMS   A review of systems has been completed and are negative for complaints except what is stated in the assessment, HPI, IH, ROS, and/or past medical history.    ALLERGIES AND MEDICATIONS     Allergies and Intolerances:   No Known Allergies   Medication Profile:   Current Outpatient Medications   Medication Instructions    metoprolol succinate XL (TOPROL-XL) 50 mg, oral, Daily, Do not crush or chew.    rosuvastatin (CRESTOR) 10 mg, oral, Daily     "  Available Vaccination Record:   Immunization History   Administered Date(s) Administered    Karel SARS-CoV-2 Vaccination 06/27/2021    Moderna SARS-CoV-2 Vaccination 12/29/2021      PHYSICAL EXAM     Vital Signs/Measurements:       9/25/2023    10:39 AM 1/8/2025    10:33 AM 1/23/2025     1:51 PM 2/10/2025     9:51 AM 2/13/2025     1:20 PM 2/13/2025     2:10 PM 3/19/2025    10:51 AM   Vitals   Systolic 153 142 128 138 146 142 155   Diastolic 97 84 70 80 87 78 93   BP Location       Left arm   Heart Rate 56    70 74 66   Temp 36.6 °C (97.8 °F)      36.5 °C (97.7 °F)   Resp 18      17   Height 1.753 m (5' 9\") 1.753 m (5' 9\") 1.753 m (5' 9\") 1.753 m (5' 9\")   1.764 m (5' 9.45\")   Weight (lb) 225.09 231 233 238 235  226.74   BMI 33.24 kg/m2 34.11 kg/m2 34.41 kg/m2 35.15 kg/m2 34.7 kg/m2  33.05 kg/m2   BSA (m2) 2.23 m2 2.26 m2 2.27 m2 2.29 m2 2.28 m2  2.25 m2   Visit Report  Report Report Report Report Report Report      Performance:   ECOG Performance Status: 0     Grade ECOG performance status   0 Fully active, able to carry on all pre-disease performance without restriction   1 Restricted in physically strenuous activity but ambulatory and able to carry out work of a light or sedentary nature, e.g., light housework, office work   2 Ambulatory and capable of all selfcare but unable to carry out any work activities; Up and about more than 50% of waking hours   3 Capable of only limited selfcare, confined to bed or chair more than 50% of waking hours   4 Completely disabled; Cannot carry out any selfcare; Totally confined to bed or chair   5 Dead     Physical Exam:  General: Patient is awake/alert/oriented x3, no distress, nourished, hydrated, and cooperative, ambulating without difficulty  Skin: Expected color for ethnicity, good turgor, dry, no prominent lesions, rashes, unusual bruising, or bleeding   Hair: Normal texture and distribution   Nails: Normal color, no deformities    HEENT:   Head: Normocephalic, " atraumatic, no visible masses  Eyes: Conjunctiva clear, sclera non-icteric, no exudates or hemorrhages   Ears: nl appearance, hearing intact    Nose: no external lesions, mucosa non-inflamed, no rhinorrhea   Mouth: Mucous membranes moist, no lesions, sores, bleeding, or erythema     Head/Neck: Neck supple, no apparent injury, thyroid without mass or tenderness, No JVD, trachea midline, no bruits appreciated    Respiratory/Thorax: Patent airways, CTAB, chest symmetry, normal inspiratory and expiratory effort    Cardiovascular: Regular rate and rhythm, no murmur or gallop, no carotid bruit or thrills    Gastrointestinal: Bowel sounds normal, non-distended, soft, no tenderness, no masses or hernia, or organomegaly appreciated    Genitourinary: Deferred   Musculoskeletal: Normal gait, normal range of motion, no pain on palpation of spine, no deformity, normal strength, no atrophy, and no CVA tenderness appreciated   Extremities: No amputations or deformities, cyanosis, edema or viscosities, peripheral pulses intact   Neurological: Sensation present to touch, intact senses, motor response and reflexes normal, normal strength   Breast:    Lymphatic: No significant lymphadenopathy   Psychological: Intact recent and remote memory, judgement, and insight. Appropriate mood, affect, and behavior          RESULTS/DATA     Labs:     Lab Results   Component Value Date    WBC 6.0 01/08/2025    NEUTROABS 3.92 04/28/2022    IGABSOL 0.01 04/28/2022    LYMPHSABS 1.87 04/28/2022    MONOSABS 0.81 (H) 04/28/2022    EOSABS 0.10 04/28/2022    BASOSABS 0.04 04/28/2022    RBC 4.71 01/08/2025    MCV 85 01/08/2025    MCHC 31.8 (L) 01/08/2025    HGB 12.8 (L) 01/08/2025    HCT 40.2 (L) 01/08/2025     01/08/2025       Lab Results   Component Value Date    CREATININE 1.07 01/08/2025    BUN 14 01/08/2025    EGFR 80 01/08/2025     01/08/2025    K 4.2 01/08/2025     01/08/2025    CO2 29 01/08/2025      Lab Results   Component Value  Date    ALT 19 01/08/2025    AST 19 01/08/2025    ALKPHOS 70 01/08/2025    BILITOT 0.5 01/08/2025      Lab Results   Component Value Date    TSH 2.06 01/08/2025     Lab Results   Component Value Date    TSH 2.06 01/08/2025        Radiology/Studies:   CT cardiac scoring wo IV contrast  1/27/2025  IMPRESSION:  1. Coronary artery calcium score of 44.18*.  2. Ascending aorta is mildly dilated at 4.3 cm diameter.      *Coronary artery calcium scoring may be helpful in predicting the  risk for future coronary heart disease events.  According to the  American College of Cardiology Foundation Clinical Expert Consensus  Task Force, such testing provides important prognostic information in  patients with more than one coronary heart disease risk factor. The  coronary artery calcium score correlates with the annual risk of a  non-fatal myocardial infarction or coronary heart disease death.      Coronary artery score            Annual Risk      0-99                             0.4%  100-399                        1.3%  >400                            2.4%      ASSESSMENT/PLAN     Assessment and Plan:   #1. Anemia   59-year-old  male presents for evaluation of anemia.  Referred by primary Dr. Teixeira.  Per medical record research anemia dates back to the past 6 years.  Hemoglobin is ranging from 11-12.7.  Notable intermittent monocytosis.  MCV normal range showing normocytic anemia.  Notably, anemia is intermittent and mild.     Past medical history of hypertension and hypercholesteremia.  Patient has reported that he is not taking metoprolol succinate or Crestor.  He takes a baby aspirin.  Colonoscopy October 2017 small mouth diverticuli noted in sigmoid colon no other abnormalities no specimen collected and again July 2022 Notable scattered small mouth diverticuli in sigmoid colon no other abnormalities.  No specimen collected.  Recommend follow-up 10 years.    Workup:  Discussed anemia workup with patient and his  wife.  He is in agreement.  We are going to assess nutritional values, inflammatory markers, uric acid, SPEP, heavy and light chains, and hemolysis labs.  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  This may cause iron deficiency anemia.  His anxiety may or may not be related to iron deficiency anemia.  Literature does note that there is increased anxiety with those that have iron deficiency anemia.  Nutritionally, the patient does not eat a lot of meat, therefore decreasing prominent iron rich foods.  He is asymptomatic.  Blood pressure elevated today though patient is anxious.  Patient appeared to have anxiety several different times during assessment.  If iron deficiency anemia is noted we will continue oral iron and reassess.  We will review complete workup in 2 weeks.  I will send him recommendations for his nutrition labs as they return.  I have asked that he get an ultrasound to assess his liver and spleen.  Lastly, I advised him to avoid NSAIDs.    Discussion of Workup:    **Please follow with specialties as scheduled for other health needs and routine screenings.**    Follow up:    RTC:  -2 weeks to review work up    Medications:  -Continue to take oral iron daily with vitamin C for improved absorption    Imaging/Testing:  -US abdomen     Referral(s):  -GI if anemia persists.    Other Pertinent Appointments:  -Cardiology 7/17/2025  -PCP 7/17/2025       Patient Discussion Summary:  Discussed plan of care in detail. Patient states understanding and in agreement to the plan. Answered all questions to there liking. The patient will call with any additional questions and/or concerns.    Thank you for allowing me to participate in your care. It was a pleasure meeting you.    Sincerely,  Shannon Echevarria, APRN-CNP     Hematology/Oncology Clinical Nurse Practitioner     [facility]   [id number] [address]  [facility] [address] [place] [id number]  Office #:  [phone]    DISCLAIMER:   In preparing for this visit and writing this note, I reviewed all the previous electronic medical records (testing, labs, imaging, and other procedures, provider notes, and medical charts) of the patient available in the physician portal pertinent to patient care. Significant findings which helped in decision making are recorded in this chart.    This document may have been written by voice recognition software.  There may be some incorrect wording, spelling and/or spelling errors or punctuation errors that were not corrected prior to committing the note to the medical record. Please request clarification if there is documentation error or clarification is needed.   Time based billing: Please see documentation within this specific encounter.

## 2025-03-20 LAB
KAPPA LC SERPL-MCNC: 1.61 MG/DL (ref 0.33–1.94)
KAPPA LC/LAMBDA SER: 1.35 {RATIO} (ref 0.26–1.65)
LAMBDA LC SERPL-MCNC: 1.19 MG/DL (ref 0.57–2.63)

## 2025-03-24 LAB
ALBUMIN: 4.3 G/DL (ref 3.4–5)
ALPHA 1 GLOBULIN: 0.3 G/DL (ref 0.2–0.6)
ALPHA 2 GLOBULIN: 0.6 G/DL (ref 0.4–1.1)
BETA GLOBULIN: 0.9 G/DL (ref 0.5–1.2)
GAMMA GLOBULIN: 0.8 G/DL (ref 0.5–1.4)
IMMUNOFIXATION COMMENT: NORMAL
PATH REVIEW - SERUM IMMUNOFIXATION: NORMAL
PATH REVIEW-SERUM PROTEIN ELECTROPHORESIS: NORMAL
PROTEIN ELECTROPHORESIS COMMENT: NORMAL

## 2025-03-25 ENCOUNTER — HOSPITAL ENCOUNTER (OUTPATIENT)
Dept: RADIOLOGY | Facility: CLINIC | Age: 60
Discharge: HOME | End: 2025-03-25
Payer: COMMERCIAL

## 2025-03-25 DIAGNOSIS — D64.9 ANEMIA, UNSPECIFIED TYPE: ICD-10-CM

## 2025-03-25 PROCEDURE — 76700 US EXAM ABDOM COMPLETE: CPT | Performed by: STUDENT IN AN ORGANIZED HEALTH CARE EDUCATION/TRAINING PROGRAM

## 2025-03-25 PROCEDURE — 76700 US EXAM ABDOM COMPLETE: CPT

## 2025-03-26 ENCOUNTER — PHARMACY VISIT (OUTPATIENT)
Dept: PHARMACY | Facility: CLINIC | Age: 60
End: 2025-03-26
Payer: COMMERCIAL

## 2025-03-26 PROCEDURE — RXMED WILLOW AMBULATORY MEDICATION CHARGE

## 2025-04-01 ENCOUNTER — APPOINTMENT (OUTPATIENT)
Dept: HEMATOLOGY/ONCOLOGY | Facility: CLINIC | Age: 60
End: 2025-04-01
Payer: COMMERCIAL

## 2025-04-07 ENCOUNTER — TELEMEDICINE (OUTPATIENT)
Dept: HEMATOLOGY/ONCOLOGY | Facility: CLINIC | Age: 60
End: 2025-04-07
Payer: COMMERCIAL

## 2025-04-07 DIAGNOSIS — D50.9 IRON DEFICIENCY ANEMIA, UNSPECIFIED IRON DEFICIENCY ANEMIA TYPE: ICD-10-CM

## 2025-04-07 DIAGNOSIS — E53.8 VITAMIN B12 DEFICIENCY: ICD-10-CM

## 2025-04-07 DIAGNOSIS — D64.9 ANEMIA, UNSPECIFIED TYPE: Primary | ICD-10-CM

## 2025-04-07 PROCEDURE — 99214 OFFICE O/P EST MOD 30 MIN: CPT

## 2025-04-07 RX ORDER — LANOLIN ALCOHOL/MO/W.PET/CERES
1000 CREAM (GRAM) TOPICAL DAILY
Qty: 100 TABLET | Refills: 0 | Status: SHIPPED | OUTPATIENT
Start: 2025-04-07

## 2025-04-07 NOTE — PATIENT INSTRUCTIONS
Continue taking ferrous sulfate 325 mg daily until next appointment.  Please stop taking iron 3 days before labs are drawn.  Start B12 1000 mcg daily.  Follow-up 3 months with labs.  Virtual is okay.  Do not note donate blood in the next 3 months until we meet.

## 2025-04-07 NOTE — PROGRESS NOTES
"[facility] [place]    PATIENT VISIT INFORMATION    Visit Type: Follow up Visit  I performed this visit using real-time telehealth tools, including an audio/video connection between (Golden Goldman at home) and (EMERITA Duckworth-CNP at [facility])  Patient consents to telemedicine service today and understands the limitations of the visit, no physical examination and all issues may not be able to be addressed today, other than brief neuro and psych assessment.     Referring Provider: Boris Lacey MD  Reason for referral: Anemia  Primary Practice Provider: Boris Lacey MD    HEMATOLOGY HISTORY    59-year-old  male presents for evaluation of anemia.  Referred by primary Dr. Teixeira.  Per medical record research anemia dates back to the past 6 years.  Hemoglobin is ranging from 11-12.7.  Notable intermittent monocytosis.     Past medical history of hypertension and hypercholesteremia.  Patient has reported that he is not taking metoprolol succinate or Crestor.  He takes a baby aspirin.    Patient gives frequent blood donation, donates double red. Donated last April 1, 2025.     Colonoscopy October 2017 small mouth diverticuli noted in sigmoid colon no other abnormalities no specimen collected and again July 2022 Notable scattered small mouth diverticuli in sigmoid colon no other abnormalities.  No specimen collected.  Recommend follow-up 10 years.    HISTORY OF PRESENT ILLNESS     ID Statement: Golden Goldman is a 59 years old male     Chief Complaint: \" feel pretty good\"     Interval History:   Golden presents for follow up.  Patient states he donated blood April 1, 2025.  He has been taking oral iron.  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  He gives this frequent and as often as possible.  He has started himself on oral iron to increase his production for donation.  He does note more recent panic attacks, though not often. Saw PCP and " modified his diet which has helped.  No acid reflux in at least 5 years.  He does note dizziness, lightheadedness little bit with exertion.  He has a very physical job and often does not drink enough water or other beverage during the day.  He has a history of colorectal bleeding found in stool on examination.  This prompted a colonoscopy.  He does not eat a lot of red meat.  Takes no medications other than oral iron.  Changed diet when blood pressure in cholesterol elevated.   He does note hand tingling at times, however it does resolve.  Uses his hands a lot with work.  He is a [employer].    He quit drinking in 2007 and wife reports no more snoring.  No other complaints.  Denies F/C/recent illness/infection, drenching night sweats, unintentional weight loss, anorexia/loss of appetite, HA, PICA, acute changes in vision/hearing, palpitations, CP, SOB, n/v/d/c/abd pain, bleeding, melena, urinary issues, haematuria, LAD, peripheral neuropathy, bone pain, bruising, sores, or rashes.      PAST/CURRENT HISTORY     MEDICAL/SURGICAL HISTORY  Past Medical History:   Diagnosis Date    Anemia       Past Surgical History:   Procedure Laterality Date    HERNIA REPAIR      OTHER SURGICAL HISTORY      Fatty lump removed        SOCIAL HISTORY  -Lives with wife Sky (three healthy children)  -Work place: [employer]   -Tobacco/smokeless use: Denies   -Alcohol: Quit 2007 alcohol use   -Illicit drug or marijuana use: Denies   -Mandaen or Spiritual beliefs: Catholic   -Social Determinates of Health Concerns: NA    FAMILY HISTORY  -Dad living 83 years old prostate cancer at age 70 years old seeds  -Mom living 78 years old MARIANA, high cholesterol   -No other known history of hematologic, bleeding, clotting, autoimmune, genetic, or malignant disorders in the family.     OCCUPATIONAL/ENVIRONMENTAL HISTORY/EXPOSURES:  -None reported    Active Problems, Allergy List, Medication List, and PMH/PSH/FH/Social Hx have been reviewed and  "reconciled in chart. Updates made when necessary.     REVIEW OF SYSTEMS   A review of systems has been completed and are negative for complaints except what is stated in the assessment, HPI, IH, ROS, and/or past medical history.    ALLERGIES AND MEDICATIONS     Allergies and Intolerances:   No Known Allergies   Medication Profile:   Current Outpatient Medications   Medication Instructions    ascorbic acid (VITAMIN C) 500 mg, oral, Daily    aspirin 81 mg, Daily    FeroSuL 325 mg, oral, Daily, Do not crush, chew, or split.    metoprolol succinate XL (TOPROL-XL) 50 mg, oral, Daily, Do not crush or chew.    rosuvastatin (CRESTOR) 10 mg, oral, Daily      Available Vaccination Record:   Immunization History   Administered Date(s) Administered    [facility] SARS-CoV-2 Vaccination 06/27/2021    Moderna SARS-CoV-2 Vaccination 12/29/2021      PHYSICAL EXAM     Vital Signs/Measurements:       9/25/2023    10:39 AM 1/8/2025    10:33 AM 1/23/2025     1:51 PM 2/10/2025     9:51 AM 2/13/2025     1:20 PM 2/13/2025     2:10 PM 3/19/2025    10:51 AM   Vitals   Systolic 153 142 128 138 146 142 155   Diastolic 97 84 70 80 87 78 93   BP Location       Left arm   Heart Rate 56    70 74 66   Temp 36.6 °C (97.8 °F)      36.5 °C (97.7 °F)   Resp 18      17   Height 1.753 m (5' 9\") 1.753 m (5' 9\") 1.753 m (5' 9\") 1.753 m (5' 9\")   1.764 m (5' 9.45\")    Weight (lb) 225.09 231 233 238 235  226.74   BMI 33.24 kg/m2 34.11 kg/m2 34.41 kg/m2 35.15 kg/m2 34.7 kg/m2  33.05 kg/m2   BSA (m2) 2.23 m2 2.26 m2 2.27 m2 2.29 m2 2.28 m2  2.25 m2   Visit Report  Report Report Report Report Report Report       Significant value      Performance:   ECOG Performance Status: 0     Grade ECOG performance status   0 Fully active, able to carry on all pre-disease performance without restriction   1 Restricted in physically strenuous activity but ambulatory and able to carry out work of a light or sedentary nature, e.g., light housework, office work   2 Ambulatory and " capable of all selfcare but unable to carry out any work activities; Up and about more than 50% of waking hours   3 Capable of only limited selfcare, confined to bed or chair more than 50% of waking hours   4 Completely disabled; Cannot carry out any selfcare; Totally confined to bed or chair   5 Dead     Physical Exam:  General: Patient is awake/alert/oriented x3, no distress   Psychological: Intact recent and remote memory, judgement, and insight. Appropriate mood, affect, and behavior           RESULTS/DATA     Labs:     Lab Results   Component Value Date    WBC 5.3 03/19/2025    NEUTROABS 2.64 03/19/2025    IGABSOL 0.00 03/19/2025    LYMPHSABS 1.51 03/19/2025    MONOSABS 0.49 03/19/2025    EOSABS 0.60 03/19/2025    BASOSABS 0.04 03/19/2025    RBC 4.83 03/19/2025    MCV 85 03/19/2025    MCHC 33.3 03/19/2025    HGB 13.6 03/19/2025    HCT 40.8 (L) 03/19/2025     03/19/2025       Lab Results   Component Value Date    CREATININE 1.03 03/19/2025    BUN 14 03/19/2025    EGFR 84 03/19/2025     03/19/2025    K 4.1 03/19/2025     03/19/2025    CO2 25 03/19/2025      Lab Results   Component Value Date    ALT 18 03/19/2025    AST 20 03/19/2025    ALKPHOS 71 03/19/2025    BILITOT 0.6 03/19/2025      Lab Results   Component Value Date    TSH 2.06 01/08/2025       Lab Results   Component Value Date    WBC 5.3 03/19/2025    NEUTROABS 2.64 03/19/2025    IGABSOL 0.00 03/19/2025    LYMPHSABS 1.51 03/19/2025    MONOSABS 0.49 03/19/2025    EOSABS 0.60 03/19/2025    BASOSABS 0.04 03/19/2025    RBC 4.83 03/19/2025    MCV 85 03/19/2025    MCHC 33.3 03/19/2025    HGB 13.6 03/19/2025    HCT 40.8 (L) 03/19/2025     03/19/2025     Lab Results   Component Value Date    RETICCTPCT 1.2 03/19/2025      Lab Results   Component Value Date    CREATININE 1.03 03/19/2025    BUN 14 03/19/2025    EGFR 84 03/19/2025     03/19/2025    K 4.1 03/19/2025     03/19/2025    CO2 25 03/19/2025      Lab Results   Component  Value Date    ALT 18 03/19/2025    AST 20 03/19/2025    ALKPHOS 71 03/19/2025    BILITOT 0.6 03/19/2025      Lab Results   Component Value Date    TSH 2.06 01/08/2025     Lab Results   Component Value Date    TSH 2.06 01/08/2025     Lab Results   Component Value Date    IRON 139 03/19/2025    TIBC 439 03/19/2025    FERRITIN 22 03/19/2025      Lab Results   Component Value Date    LGQRIAKP33 301 03/19/2025      Lab Results   Component Value Date    FOLATE 21.2 03/19/2025     Lab Results   Component Value Date    SEDRATE 9 03/19/2025      Lab Results   Component Value Date    CRP 0.68 03/19/2025     Lab Results   Component Value Date     03/19/2025     Lab Results   Component Value Date    HAPTOGLOBIN 120 03/19/2025     Lab Results   Component Value Date    SPEP Normal. 03/19/2025     Lab Results   Component Value Date     03/19/2025    IGM 42 03/19/2025     03/19/2025           Radiology/Studies:   CT cardiac scoring wo IV contrast  1/27/2025  IMPRESSION:  1. Coronary artery calcium score of 44.18*.  2. Ascending aorta is mildly dilated at 4.3 cm diameter.      *Coronary artery calcium scoring may be helpful in predicting the  risk for future coronary heart disease events.  According to the  American College [employer] Foundation Clinical Expert Consensus  Task Force, such testing provides important prognostic information in  patients with more than one coronary heart disease risk factor. The  coronary artery calcium score correlates with the annual risk of a  non-fatal myocardial infarction or coronary heart disease death.      Coronary artery score            Annual Risk      0-99                             0.4%  100-399                        1.3%  >400                            2.4%    US abdomen complete  3/26/2025  IMPRESSION:  1. No hepatosplenomegaly as per clinically queried.          ASSESSMENT/PLAN     Assessment and Plan:   #1. Anemia   #2. Mild Iron Deficiency and low  B12  59-year-old  male presents for evaluation of anemia.  Referred by primary Dr. Teixeira.  Per medical record research anemia dates back to the past 6 years.  Hemoglobin is ranging from 11-12.7.  Notable intermittent monocytosis.  MCV normal range showing normocytic anemia.  Notably, anemia is intermittent and mild.     Past medical history of hypertension and hypercholesteremia.  Patient has reported that he is not taking metoprolol succinate or Crestor.  He takes a baby aspirin.  Colonoscopy October 2017 small mouth diverticuli noted in sigmoid colon no other abnormalities no specimen collected and again July 2022 Notable scattered small mouth diverticuli in sigmoid colon no other abnormalities.  No specimen collected.  Recommend follow-up 10 years.    Workup:  Discussed anemia workup with patient and his wife.  He is in agreement.  We are going to assess nutritional values, inflammatory markers, uric acid, SPEP, heavy and light chains, and hemolysis labs.  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  This may cause iron deficiency anemia.  His anxiety may or may not be related to iron deficiency anemia.  Literature does note that there is increased anxiety with those that have iron deficiency anemia.  Nutritionally, the patient does not eat a lot of meat, therefore decreasing prominent iron rich foods.  He is asymptomatic.  Blood pressure elevated today though patient is anxious.  Patient appeared to have anxiety several different times during assessment.  If iron deficiency anemia is noted we will continue oral iron and reassess.  We will review complete workup in 2 weeks.  I will send him recommendations for his nutrition labs as they return.  I have asked that he get an ultrasound to assess his liver and spleen.  Lastly, I advised him to avoid NSAIDs.    Discussion of Workup:  Fairly benign workup.  Patient has mild iron deficiency and low B12.  No SPEP abnormality.  Heavy and light chains  normal.  Inflammatory markers normal.  CBC 13.6 with increased RDW.  Patient is frequently donating blood and doubling up donations.  He last donated April 1, 2025.  He is continue to take oral iron daily.  He will take B12 daily.  Advised to hold off on donations to give his body time to replenish iron and red blood cells.  He states understanding and in agreement.  We will recheck in 3 months.  If labs are stable, he can return to primary.    **Please follow with specialties as scheduled for other health needs and routine screenings.**    Follow up:    RTC:  -3 months with labs     Medications:  -Continue to take oral iron daily with vitamin C for improved absorption  - Take B12 1000 mcg daily    Imaging/Testing:  -NA    Referral(s):  -GI if anemia persists.    Other Pertinent Appointments:  -Cardiology 7/17/2025  -PCP 7/17/2025       Patient Discussion Summary:  Discussed plan of care in detail. Patient states understanding and in agreement to the plan. Answered all questions to there liking. The patient will call with any additional questions and/or concerns.    Thank you for allowing me to participate in your care. It was a pleasure meeting you.    Sincerely,  Shannon Echevarria, APRN-CNP     Hematology/Oncology Clinical Nurse Practitioner     [facility]   [id number]  [facility] [address]  [place] [address]  Office #: [phone]    DISCLAIMER:   In preparing for this visit and writing this note, I reviewed all the previous electronic medical records (testing, labs, imaging, and other procedures, provider notes, and medical charts) of the patient available in the physician portal pertinent to patient care. Significant findings which helped in decision making are recorded in this chart.    This document may have been written by voice recognition software.  There may be some incorrect wording, spelling and/or spelling errors or punctuation errors that were not corrected prior to  committing the note to the medical record. Please request clarification if there is documentation error or clarification is needed.   Time based billing: Please see documentation within this specific encounter.

## 2025-04-12 ENCOUNTER — PHARMACY VISIT (OUTPATIENT)
Dept: PHARMACY | Facility: CLINIC | Age: 60
End: 2025-04-12
Payer: COMMERCIAL

## 2025-04-12 PROCEDURE — RXMED WILLOW AMBULATORY MEDICATION CHARGE

## 2025-05-02 ENCOUNTER — PHARMACY VISIT (OUTPATIENT)
Dept: PHARMACY | Facility: CLINIC | Age: 60
End: 2025-05-02
Payer: COMMERCIAL

## 2025-05-02 PROCEDURE — RXMED WILLOW AMBULATORY MEDICATION CHARGE

## 2025-06-03 ENCOUNTER — PHARMACY VISIT (OUTPATIENT)
Dept: PHARMACY | Facility: CLINIC | Age: 60
End: 2025-06-03
Payer: COMMERCIAL

## 2025-06-03 PROCEDURE — RXMED WILLOW AMBULATORY MEDICATION CHARGE

## 2025-07-03 DIAGNOSIS — D64.9 ANEMIA, UNSPECIFIED TYPE: ICD-10-CM

## 2025-07-03 PROCEDURE — RXMED WILLOW AMBULATORY MEDICATION CHARGE

## 2025-07-03 RX ORDER — FERROUS SULFATE 325(65) MG
1 TABLET ORAL DAILY
Qty: 30 TABLET | Refills: 2 | Status: SHIPPED | OUTPATIENT
Start: 2025-07-03

## 2025-07-05 ENCOUNTER — PHARMACY VISIT (OUTPATIENT)
Dept: PHARMACY | Facility: CLINIC | Age: 60
End: 2025-07-05
Payer: COMMERCIAL

## 2025-07-07 ENCOUNTER — LAB (OUTPATIENT)
Dept: LAB | Facility: HOSPITAL | Age: 60
End: 2025-07-07
Payer: COMMERCIAL

## 2025-07-07 DIAGNOSIS — D50.9 IRON DEFICIENCY ANEMIA, UNSPECIFIED: ICD-10-CM

## 2025-07-07 DIAGNOSIS — D64.9 ANEMIA, UNSPECIFIED TYPE: ICD-10-CM

## 2025-07-07 DIAGNOSIS — D50.9 IRON DEFICIENCY ANEMIA, UNSPECIFIED IRON DEFICIENCY ANEMIA TYPE: ICD-10-CM

## 2025-07-07 DIAGNOSIS — D64.9 ANEMIA, UNSPECIFIED: Primary | ICD-10-CM

## 2025-07-07 LAB
BASOPHILS # BLD AUTO: 0.04 X10*3/UL (ref 0–0.1)
BASOPHILS NFR BLD AUTO: 0.7 %
EOSINOPHIL # BLD AUTO: 0.11 X10*3/UL (ref 0–0.7)
EOSINOPHIL NFR BLD AUTO: 1.9 %
ERYTHROCYTE [DISTWIDTH] IN BLOOD BY AUTOMATED COUNT: 13.7 % (ref 11.5–14.5)
FERRITIN SERPL-MCNC: 74 NG/ML (ref 20–300)
HCT VFR BLD AUTO: 44 % (ref 41–52)
HGB BLD-MCNC: 15.2 G/DL (ref 13.5–17.5)
IMM GRANULOCYTES # BLD AUTO: 0.02 X10*3/UL (ref 0–0.7)
IMM GRANULOCYTES NFR BLD AUTO: 0.4 % (ref 0–0.9)
IRON SATN MFR SERPL: 38 % (ref 25–45)
IRON SERPL-MCNC: 144 UG/DL (ref 35–150)
LYMPHOCYTES # BLD AUTO: 2.11 X10*3/UL (ref 1.2–4.8)
LYMPHOCYTES NFR BLD AUTO: 37.3 %
MCH RBC QN AUTO: 31.7 PG (ref 26–34)
MCHC RBC AUTO-ENTMCNC: 34.5 G/DL (ref 32–36)
MCV RBC AUTO: 92 FL (ref 80–100)
MONOCYTES # BLD AUTO: 0.6 X10*3/UL (ref 0.1–1)
MONOCYTES NFR BLD AUTO: 10.6 %
NEUTROPHILS # BLD AUTO: 2.77 X10*3/UL (ref 1.2–7.7)
NEUTROPHILS NFR BLD AUTO: 49.1 %
NRBC BLD-RTO: 0 /100 WBCS (ref 0–0)
PLATELET # BLD AUTO: 213 X10*3/UL (ref 150–450)
RBC # BLD AUTO: 4.79 X10*6/UL (ref 4.5–5.9)
TIBC SERPL-MCNC: 378 UG/DL (ref 240–445)
UIBC SERPL-MCNC: 234 UG/DL (ref 110–370)
WBC # BLD AUTO: 5.7 X10*3/UL (ref 4.4–11.3)

## 2025-07-07 PROCEDURE — 85025 COMPLETE CBC W/AUTO DIFF WBC: CPT

## 2025-07-07 PROCEDURE — 83550 IRON BINDING TEST: CPT

## 2025-07-07 PROCEDURE — 83540 ASSAY OF IRON: CPT

## 2025-07-07 PROCEDURE — 36415 COLL VENOUS BLD VENIPUNCTURE: CPT

## 2025-07-07 PROCEDURE — 82728 ASSAY OF FERRITIN: CPT

## 2025-07-08 ENCOUNTER — TELEMEDICINE (OUTPATIENT)
Dept: HEMATOLOGY/ONCOLOGY | Facility: CLINIC | Age: 60
End: 2025-07-08
Payer: COMMERCIAL

## 2025-07-08 DIAGNOSIS — O28.0 LOW MATERNAL SERUM VITAMIN B12: Primary | ICD-10-CM

## 2025-07-08 DIAGNOSIS — D50.9 IRON DEFICIENCY ANEMIA, UNSPECIFIED IRON DEFICIENCY ANEMIA TYPE: ICD-10-CM

## 2025-07-08 DIAGNOSIS — D64.9 ANEMIA, UNSPECIFIED TYPE: ICD-10-CM

## 2025-07-08 PROCEDURE — 99215 OFFICE O/P EST HI 40 MIN: CPT

## 2025-07-08 NOTE — PROGRESS NOTES
"[facility] [place]    PATIENT VISIT INFORMATION    Visit Type: Follow up Visit    I performed this visit using real-time telehealth tools, including an audio/video connection between (Golden Goldman at home) and (EMERITA Duckworth-CNP at [facility])  Patient consents to telemedicine service today and understands the limitations of the visit, no physical examination and all issues may not be able to be addressed today, other than brief neuro and psych assessment.     Referring Provider: Boris Lacey MD  Reason for referral: Anemia  Primary Practice Provider: Boris Lacey MD    HEMATOLOGY HISTORY    60-year-old  male presents for follow up regarding his anemia history.  Referred by primary Dr. Teixeira.  Per medical record research anemia dates back to the past 6 years.  Hemoglobin is ranging from 11-12.7.  Notable intermittent monocytosis.     Past medical history of hypertension and hypercholesteremia.  Patient has reported that he is not taking metoprolol succinate or Crestor.  He takes a baby aspirin.    Patient gives frequent blood donation, donates double red. Donated last April 1, 2025.     Colonoscopy October 2017 small mouth diverticuli noted in sigmoid colon no other abnormalities no specimen collected and again July 2022 Notable scattered small mouth diverticuli in sigmoid colon no other abnormalities.  No specimen collected.  Recommend follow-up 10 years.    HISTORY OF PRESENT ILLNESS     ID Statement: Golden Goldman is a 60 years old male     Chief Complaint: \" feel much better\"    Interval History:   Golden presents for follow up.    He has been taking oral iron, B12, and vitamin C daily.  In addition, he has changed his lifestyle habits, changing eating habits being more active.    No acid reflux in at least 5 years.  He does note dizziness, lightheadedness little bit with exertion.  He has a very physical job and often was not drinking enough water " during the day.  He has since increased his intake of fluids.  He does not drink soda pop often.    He has a history of colorectal bleeding found in stool on examination.  This prompted a colonoscopy.  No bleeding at this time.    He does not eat a lot of red meat. Changed diet when blood pressure and cholesterol elevated.  Plans to follow cardiology in the next week and a half to recheck lipids and blood pressure.    He does note hand tingling at times, however it does resolve.  Uses his hands a lot with work.  He is a [employer].      He quit drinking in 2007 and wife reports no more snoring.  No other complaints.    Denies F/C/recent illness/infection, drenching night sweats, unintentional weight loss, anorexia/loss of appetite, HA, PICA, acute changes in vision/hearing, palpitations, CP, SOB, n/v/d/c/abd pain, bleeding, melena, urinary issues, haematuria, LAD, peripheral neuropathy, bone pain, bruising, sores, or rashes.      PAST/CURRENT HISTORY     MEDICAL/SURGICAL HISTORY  Past Medical History:   Diagnosis Date    Anemia [id number]    Hypertension 07/2023      Past Surgical History:   Procedure Laterality Date    HERNIA REPAIR  2010    OTHER SURGICAL HISTORY      Fatty lump removed        SOCIAL HISTORY  -Lives with wife Sky (three healthy children)  -Work place: [employer]   -Tobacco/smokeless use: Denies   -Alcohol: Quit 2007 alcohol use   -Illicit drug or marijuana use: Denies   -Religion or Spiritual beliefs: Mormon   -Social Determinates of Health Concerns: NA    FAMILY HISTORY  -Dad living 83 years old prostate cancer at age 70 years old seeds  -Mom living 78 years old MARIANA, high cholesterol   -No other known history of hematologic, bleeding, clotting, autoimmune, genetic, or malignant disorders in the family.     OCCUPATIONAL/ENVIRONMENTAL HISTORY/EXPOSURES:  -None reported    Active Problems, Allergy List, Medication List, and PMH/PSH/FH/Social Hx have been reviewed and reconciled in chart. Updates  "made when necessary.     REVIEW OF SYSTEMS   A review of systems has been completed and are negative for complaints except what is stated in the assessment, HPI, IH, ROS, and/or past medical history.    ALLERGIES AND MEDICATIONS     Allergies and Intolerances:   No Known Allergies   Medication Profile:   Current Outpatient Medications   Medication Instructions    ascorbic acid (VITAMIN C) 500 mg, oral, Daily    aspirin 81 mg, Daily    ferrous sulfate (FeroSuL) 325 mg (65 mg elemental) tablet Take 1 tablet (325 mg) by mouth once daily. Do not crush, chew, or split.    Vitamin B-12 1,000 mcg, oral, Daily      Available Vaccination Record:   Immunization History   Administered Date(s) Administered    Karel SARS-CoV-2 Vaccination 06/27/2021    Moderna COVID-19 vaccine, bivalent, blue cap/gray label *Check age/dose* 11/22/2022    Moderna SARS-CoV-2 Vaccination 12/29/2021      PHYSICAL EXAM     Vital Signs/Measurements:       9/25/2023    10:39 AM 1/8/2025    10:33 AM 1/23/2025     1:51 PM 2/10/2025     9:51 AM 2/13/2025     1:20 PM 2/13/2025     2:10 PM 3/19/2025    10:51 AM   Vitals   Systolic 153 142 128 138 146 142 155   Diastolic 97 84 70 80 87 78 93   BP Location       Left arm   Heart Rate 56    70 74 66   Temp 36.6 °C (97.8 °F)      36.5 °C (97.7 °F)   Resp 18      17   Height 1.753 m (5' 9\") 1.753 m (5' 9\") 1.753 m (5' 9\") 1.753 m (5' 9\")   1.764 m (5' 9.45\")    Weight (lb) 225.09 231 233 238 235  226.74   BMI 33.24 kg/m2 34.11 kg/m2 34.41 kg/m2 35.15 kg/m2 34.7 kg/m2  33.05 kg/m2   BSA (m2) 2.23 m2 2.26 m2 2.27 m2 2.29 m2 2.28 m2  2.25 m2   Visit Report  Report Report Report Report Report Report       Significant value      Performance:   ECOG Performance Status: 0     Grade ECOG performance status   0 Fully active, able to carry on all pre-disease performance without restriction   1 Restricted in physically strenuous activity but ambulatory and able to carry out work of a light or sedentary nature, e.g., " light housework, office work   2 Ambulatory and capable of all selfcare but unable to carry out any work activities; Up and about more than 50% of waking hours   3 Capable of only limited selfcare, confined to bed or chair more than 50% of waking hours   4 Completely disabled; Cannot carry out any selfcare; Totally confined to bed or chair   5 Dead     Physical Exam:  General: Patient is awake/alert/oriented x3, no distress   Psychological: Intact recent and remote memory, judgement, and insight. Appropriate mood, affect, and behavior     RESULTS/DATA     Labs:     Lab Results   Component Value Date    WBC 5.7 07/07/2025    NEUTROABS 2.77 07/07/2025    IGABSOL 0.02 07/07/2025    LYMPHSABS 2.11 07/07/2025    MONOSABS 0.60 07/07/2025    EOSABS 0.11 07/07/2025    BASOSABS 0.04 07/07/2025    RBC 4.79 07/07/2025    MCV 92 07/07/2025    MCHC 34.5 07/07/2025    HGB 15.2 07/07/2025    HCT 44.0 07/07/2025     07/07/2025       Lab Results   Component Value Date    CREATININE 1.03 03/19/2025    BUN 14 03/19/2025    EGFR 84 03/19/2025     03/19/2025    K 4.1 03/19/2025     03/19/2025    CO2 25 03/19/2025      Lab Results   Component Value Date    ALT 18 03/19/2025    AST 20 03/19/2025    ALKPHOS 71 03/19/2025    BILITOT 0.6 03/19/2025      Lab Results   Component Value Date    TSH 2.06 01/08/2025       Lab Results   Component Value Date    WBC 5.7 07/07/2025    NEUTROABS 2.77 07/07/2025    IGABSOL 0.02 07/07/2025    LYMPHSABS 2.11 07/07/2025    MONOSABS 0.60 07/07/2025    EOSABS 0.11 07/07/2025    BASOSABS 0.04 07/07/2025    RBC 4.79 07/07/2025    MCV 92 07/07/2025    MCHC 34.5 07/07/2025    HGB 15.2 07/07/2025    HCT 44.0 07/07/2025     07/07/2025     Lab Results   Component Value Date    RETICCTPCT 1.2 03/19/2025      Lab Results   Component Value Date    CREATININE 1.03 03/19/2025    BUN 14 03/19/2025    EGFR 84 03/19/2025     03/19/2025    K 4.1 03/19/2025     03/19/2025    CO2 25  03/19/2025      Lab Results   Component Value Date    ALT 18 03/19/2025    AST 20 03/19/2025    ALKPHOS 71 03/19/2025    BILITOT 0.6 03/19/2025      Lab Results   Component Value Date    TSH 2.06 01/08/2025     Lab Results   Component Value Date    TSH 2.06 01/08/2025     Lab Results   Component Value Date    IRON 144 07/07/2025    TIBC 378 07/07/2025    FERRITIN 74 07/07/2025      Lab Results   Component Value Date    LGSOXUTD15 301 03/19/2025      Lab Results   Component Value Date    FOLATE 21.2 03/19/2025     Lab Results   Component Value Date    SEDRATE 9 03/19/2025      Lab Results   Component Value Date    CRP 0.68 03/19/2025     Lab Results   Component Value Date     03/19/2025     Lab Results   Component Value Date    HAPTOGLOBIN 120 03/19/2025     Lab Results   Component Value Date    SPEP Normal. 03/19/2025     Lab Results   Component Value Date     03/19/2025    IGM 42 03/19/2025     03/19/2025           Radiology/Studies:   CT cardiac scoring wo IV contrast  1/27/2025  IMPRESSION:  1. Coronary artery calcium score of 44.18*.  2. Ascending aorta is mildly dilated at 4.3 cm diameter.      *Coronary artery calcium scoring may be helpful in predicting the  risk for future coronary heart disease events.  According to the  American College of Cardiology Foundation Clinical Expert Consensus  Task Force, such testing provides important prognostic information in  patients with more than one coronary heart disease risk factor. The  coronary artery calcium score correlates with the annual risk of a  non-fatal myocardial infarction or coronary heart disease death.      Coronary artery score            Annual Risk      0-99                             0.4%  100-399                        1.3%  >400                            2.4%    US abdomen complete  3/26/2025  IMPRESSION:  1. No hepatosplenomegaly as per clinically queried.          ASSESSMENT/PLAN     Assessment and Plan:   #1. Anemia   #2.  Mild Iron Deficiency and low B12 serum levels  59-year-old  male presents for evaluation of anemia.  Referred by primary Dr. Teixeira.  Per medical record research anemia dates back to the past 6 years.  Hemoglobin is ranging from 11-12.7.  Notable intermittent monocytosis.  MCV normal range showing normocytic anemia.  Notably, anemia is intermittent and mild.     Past medical history of hypertension and hypercholesteremia.  Patient has reported that he is not taking metoprolol succinate or Crestor.  He takes a baby aspirin.    Colonoscopy October 2017 small mouth diverticuli noted in sigmoid colon no other abnormalities no specimen collected and again July 2022 Notable scattered small mouth diverticuli in sigmoid colon no other abnormalities.  No specimen collected.  Recommend follow-up 10 years.    Workup:  Discussed anemia workup with patient and his wife.  He is in agreement.  We are going to assess nutritional values, inflammatory markers, uric acid, SPEP, heavy and light chains, and hemolysis labs.  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  This may cause iron deficiency anemia.  His anxiety may or may not be related to iron deficiency anemia.  Literature does note that there is increased anxiety with those that have iron deficiency anemia.  Nutritionally, the patient does not eat a lot of meat, therefore decreasing prominent iron rich foods.  He is asymptomatic.  Blood pressure elevated today though patient is anxious.  Patient appeared to have anxiety several different times during assessment.  If iron deficiency anemia is noted we will continue oral iron and reassess.  We will review complete workup in 2 weeks.  I will send him recommendations for his nutrition labs as they return.  I have asked that he get an ultrasound to assess his liver and spleen.  Lastly, I advised him to avoid NSAIDs.    Discussion of Workup:  Fairly benign workup.  Patient has mild iron deficiency and low B12.   No SPEP abnormality.  Heavy and light chains normal.  Inflammatory markers normal.  CBC 13.6 with increased RDW.  Patient is frequently donating blood and doubling up donations.  He last donated April 1, 2025.  He is continue to take oral iron daily.  He will take B12 daily.  Advised to hold off on donations to give his body time to replenish iron and red blood cells.  He states understanding and in agreement.  We will recheck in 3 months.  If labs are stable, he can return to [facility].    Visit 7/8/2025:  Golden presents virtually to review most recent labs.  His complete blood count is unremarkable hemoglobin 15.2 and no other abnormalities in the differential.  His iron saturation is 38% and his total iron is 144.  Ferritin increased from 22 to now 74.  The remainder of his workup couple.    Likely donating double reds increased iron deficiency along with unhealthy eating habits, now improved from current changes discussed above.    Problem List Items Addressed This Visit    None  Visit Diagnoses         Codes      Low maternal serum vitamin B12    -  Primary O28.0    Relevant Orders    Clinic Appointment Request Virtual Est    Oncology Line Draw Appointment Request    CBC and Auto Differential    Iron and TIBC    Vitamin B12    Ferritin      Anemia, unspecified type     D64.9    Relevant Orders    Clinic Appointment Request Virtual Est    Oncology Line Draw Appointment Request    CBC and Auto Differential    Iron and TIBC    Vitamin B12    Ferritin      Iron deficiency anemia, unspecified iron deficiency anemia type     D50.9    Relevant Orders    Clinic Appointment Request Virtual Est    Oncology Line Draw Appointment Request    CBC and Auto Differential    Iron and TIBC    Vitamin B12    Ferritin           **Please follow with specialties as scheduled for other health needs and routine screenings.**    Follow up:    RTC:  -3 months with labs     Medications:  -Continue to take oral iron daily with vitamin C for  improved absorption every other day  - Take B12 1000 mcg every other day    Imaging/Testing:  -NA    Referral(s):  -GI if anemia persists    Other Pertinent Appointments:  -Cardiology 7/17/2025  -PCP 7/17/2025       Patient Discussion Summary:  Discussed plan of care in detail. Patient states understanding and in agreement to the plan. Answered all questions to there liking. The patient will call with any additional questions and/or concerns.    Thank you for allowing me to participate in your care. It was a pleasure meeting you.    Sincerely,  Shannon Echevarria, APRN-CNP     Hematology/Oncology Clinical Nurse Practitioner     [facility]   [address]  [place] [address] [place] [address]  Office #: [phone]    DISCLAIMER:   In preparing for this visit and writing this note, I reviewed all the previous electronic medical records (testing, labs, imaging, and other procedures, provider notes, and medical charts) of the patient available in the physician portal pertinent to patient care. Significant findings which helped in decision making are recorded in this chart.    This document may have been written by voice recognition software.  There may be some incorrect wording, spelling and/or spelling errors or punctuation errors that were not corrected prior to committing the note to the medical record. Please request clarification if there is documentation error or clarification is needed.   Time based billing: Please see documentation within this specific encounter.

## 2025-07-13 NOTE — PROGRESS NOTES
Primary Care Physician: Boris Lacey MD  Date of Visit: 07/17/2025  1:45 PM EDT  Location of visit: [address]     Chief Complaint: No chief complaint on file.    HPI / Summary:   Golden Goldman is a 60 y.o. male presents for new patient    ROS    Medical History:   He has a past medical history of Anemia (07082025) and Hypertension (07/2023).  Surgical Hx:   He has a past surgical history that includes Other surgical history and Hernia repair (2010).   Social Hx:   He reports that he has never smoked. He has never used smokeless tobacco. He reports that he does not currently use alcohol. He reports that he does not use drugs.  Family Hx:   His family history includes Coronary artery disease in his mother; Diabetes in his mother; Hyperlipidemia in his mother; blood pressure in his father.   Allergies:  No Known Allergies  Outpatient Medications:  Current Outpatient Medications   Medication Instructions    ascorbic acid (VITAMIN C) 500 mg, oral, Daily    aspirin 81 mg, Daily    ferrous sulfate (FeroSuL) 325 mg (65 mg elemental) tablet Take 1 tablet (325 mg) by mouth once daily. Do not crush, chew, or split.    Vitamin B-12 1,000 mcg, oral, Daily     Physical Exam:  There were no vitals filed for this visit.    Wt Readings from Last 5 Encounters:   03/19/25 103 kg (226 lb 11.9 oz)   02/13/25 107 kg (235 lb)   02/10/25 108 kg (238 lb)   01/23/25 106 kg (233 lb)   01/08/25 105 kg (231 lb)     Physical Exam  JVP not elevated. Carotid impulses are 2+ without overlying bruit.   Chest exhibits fair to good air movement with completely clear breath sounds.   The cardiac rhythm is regular with no premature beats.   Normal S1 and S2. No gallop, murmur or rub, or click.   Abdomen is soft and benign without focal tenderness.   With no lower leg edema. The pedal pulses are intact.     Last Labs:  Orders Only on 07/07/2025   Component Date Value    WBC 07/07/2025 5.7     nRBC 07/07/2025 0.0     RBC 07/07/2025 4.79      Hemoglobin 07/07/2025 15.2     Hematocrit 07/07/2025 44.0     MCV 07/07/2025 92     MCH 07/07/2025 31.7     MCHC 07/07/2025 34.5     RDW 07/07/2025 13.7     Platelets 07/07/2025 213     Neutrophils % 07/07/2025 49.1     Immature Granulocytes %,* 07/07/2025 0.4     Lymphocytes % 07/07/2025 37.3     Monocytes % 07/07/2025 10.6     Eosinophils % 07/07/2025 1.9     Basophils % 07/07/2025 0.7     Neutrophils Absolute 07/07/2025 2.77     Immature Granulocytes Ab* 07/07/2025 0.02     Lymphocytes Absolute 07/07/2025 2.11     Monocytes Absolute 07/07/2025 0.60     Eosinophils Absolute 07/07/2025 0.11     Basophils Absolute 07/07/2025 0.04     Iron 07/07/2025 144     UIBC 07/07/2025 234     TIBC 07/07/2025 378     % Saturation 07/07/2025 38     Ferritin 07/07/2025 74    Office Visit on 03/19/2025   Component Date Value    WBC 03/19/2025 5.3     nRBC 03/19/2025      RBC 03/19/2025 4.83     Hemoglobin 03/19/2025 13.6     Hematocrit 03/19/2025 40.8 (L)     MCV 03/19/2025 85     MCH 03/19/2025 28.2     MCHC 03/19/2025 33.3     RDW 03/19/2025 17.0 (H)     Platelets 03/19/2025 216     Neutrophils % 03/19/2025 49.9     Immature Granulocytes %,* 03/19/2025 0.0     Lymphocytes % 03/19/2025 28.6     Monocytes % 03/19/2025 9.3     Eosinophils % 03/19/2025 11.4     Basophils % 03/19/2025 0.8     Neutrophils Absolute 03/19/2025 2.64     Immature Granulocytes Ab* 03/19/2025 0.00     Lymphocytes Absolute 03/19/2025 1.51     Monocytes Absolute 03/19/2025 0.49     Eosinophils Absolute 03/19/2025 0.60     Basophils Absolute 03/19/2025 0.04     Glucose 03/19/2025 93     Sodium 03/19/2025 136     Potassium 03/19/2025 4.1     Chloride 03/19/2025 103     Bicarbonate 03/19/2025 25     Anion Gap 03/19/2025 12     Urea Nitrogen 03/19/2025 14     Creatinine 03/19/2025 1.03     eGFR 03/19/2025 84     Calcium 03/19/2025 9.2     Albumin 03/19/2025 4.4     Alkaline Phosphatase 03/19/2025 71     Total Protein 03/19/2025 6.8     AST 03/19/2025 20      Bilirubin, Total 03/19/2025 0.6     ALT 03/19/2025 18     Retic % 03/19/2025 1.2     Retic Absolute 03/19/2025 0.059     Reticulocyte Hemoglobin 03/19/2025 32     Immature Retic fraction 03/19/2025 9.2     Haptoglobin 03/19/2025 120     LDH 03/19/2025 196     GRACE-POLYSPECIFIC 03/19/2025 NEG     Iron 03/19/2025 139     UIBC 03/19/2025 300     TIBC 03/19/2025 439     % Saturation 03/19/2025 32     Ferritin 03/19/2025 22     Ig [place] Free Light Chain 03/19/2025 1.61     Ig Lambda Free Light Pamela* 03/19/2025 1.19     Kappa/Lambda Ratio 03/19/2025 1.35     C-Reactive Protein 03/19/2025 0.68     Sedimentation Rate 03/19/2025 9     Uric Acid 03/19/2025 4.4     Folate, Serum 03/19/2025 21.2     Vitamin B12 03/19/2025 301     IgG 03/19/2025 931     IgA 03/19/2025 198     IgM 03/19/2025 42     Total Protein 03/19/2025 6.8     Albumin 03/19/2025 4.3     Alpha 1 Globulin 03/19/2025 0.3     Alpha 2 Globulin 03/19/2025 0.6     Beta Globulin 03/19/2025 0.9     Gamma 03/19/2025 0.8     Protein Electrophoresis * 03/19/2025 Normal.     Immunofixation Comment 03/19/2025 No monoclonal protein detected by immunofixation.     Path Review - Serum Prot* 03/19/2025 Reviewed and approved by JOHNATHAN DIETZ on 3/24/25 at 11:40 AM.         Path Review - Serum Immu* 03/19/2025 Reviewed and approved by JOHNATHAN DIETZ on 3/24/25 at 11:40 AM.        Lab on 01/24/2025   Component Date Value    Prostate Specific Antige* 01/24/2025 1.51         Assessment/Plan   1.  Not hypertensive.  2.  Nondiabetic.  3.  Mild hyperlipidemia.  The patient did have a lipid panel performed 20 2025 with cholesterol 214 HDL 52  triglyceride 96.  The patient does however have a slightly elevated CT calcium score and he will be started on rosuvastatin 10 mg daily.  At time of office visit 7/17/2025 patient has not yet started his recommended rosuvastatin 10 mg daily which was represcribed.  Recheck lipid panel prior to next visit 6 months.  4.  Coronary  artery disease.  This patient did have a CT coronary calcium score performed 1/24/2025 with a value of only 44 placing him at low risk.  He is asymptomatic and as such we will defer surveillance stress testing.  He will however be started on the rosuvastatin 10 mg daily as noted above along with Toprol-XL 50 mg daily.  The patient was noted on the CT coronary calcium score to have a dilated ascending thoracic aorta 4.3 cm.  A echocardiogram on 1/16/2025 showed a preserved LV ejection fraction of 55 to 60% with only mild dilatation of the ascending thoracic aorta measuring 4.0 cm.  Will monitor by serial echoes.   5.  Non-smoker.  6.  Palpitations.  This patient did wear Holter monitor 1/16/2025 - 1/30/2025 showing sinus rhythm averaging 70/min minimal rate 49/min.  The patient had only 31 PVCs and 83 PACs less than 1% of record with 2 episodes of SVT longest being 7 beats.  The total duration of monitored rhythm was actually 7 hours and 47 minutes.  Patient will be started on Toprol-XL 50 mg daily to back primarily because of his coronary artery disease.  At time of office visit 7/17/2025 patient states that his palpitations have resolved even though he has declined taking the previously recommended Toprol XL 50 mg daily.  7.  History of anxiety with panic attacks.  Patient has had 4 panic attacks with hyperventilation over the past 1 year.  8.  Status post hernia repair  9.  Status post excision of lipoma.  10.  Anemia.  The patient evidently has a history of anemia in the past although he has been a frequent blood donor double red.  He currently is taking supplemental ferrous sulfate vitamin C and vitamin B12.  Lab work 7/7/2025 included a normal CBC hemoglobin 15.2 hematocrit 44.0 serum iron 144 ferritin 74.  His ferrous sulfate has been reduced to every other day.        Orders:  No orders of the defined types were placed in this encounter.     Followup Appts:  Future Appointments   Date Time Provider Department  [place]   7/17/2025  1:45 PM Nik Pack MD [id number] [facility]   10/8/2025  8:00 AM Shannon Echevarria, APRN-[facility] [id number] [facility]           ____________________________________________________________  Nik Pack MD  [facility]  Assistant Clinical Professor, [employer]  [facility]

## 2025-07-17 ENCOUNTER — APPOINTMENT (OUTPATIENT)
Dept: CARDIOLOGY | Facility: CLINIC | Age: 60
End: 2025-07-17
Payer: COMMERCIAL

## 2025-07-17 VITALS
OXYGEN SATURATION: 97 % | HEIGHT: 69 IN | DIASTOLIC BLOOD PRESSURE: 78 MMHG | HEART RATE: 68 BPM | WEIGHT: 226 LBS | BODY MASS INDEX: 33.47 KG/M2 | SYSTOLIC BLOOD PRESSURE: 130 MMHG

## 2025-07-17 DIAGNOSIS — E78.00 HYPERCHOLESTEROLEMIA: ICD-10-CM

## 2025-07-17 PROCEDURE — 3075F SYST BP GE 130 - 139MM HG: CPT | Performed by: INTERNAL MEDICINE

## 2025-07-17 PROCEDURE — 3008F BODY MASS INDEX DOCD: CPT | Performed by: INTERNAL MEDICINE

## 2025-07-17 PROCEDURE — 99214 OFFICE O/P EST MOD 30 MIN: CPT | Performed by: INTERNAL MEDICINE

## 2025-07-17 PROCEDURE — 99212 OFFICE O/P EST SF 10 MIN: CPT

## 2025-07-17 PROCEDURE — 3078F DIAST BP <80 MM HG: CPT | Performed by: INTERNAL MEDICINE

## 2025-07-17 PROCEDURE — RXMED WILLOW AMBULATORY MEDICATION CHARGE

## 2025-07-17 PROCEDURE — 1036F TOBACCO NON-USER: CPT | Performed by: INTERNAL MEDICINE

## 2025-07-17 RX ORDER — ROSUVASTATIN CALCIUM 10 MG/1
10 TABLET, COATED ORAL DAILY
Qty: 90 TABLET | Refills: 3 | Status: SHIPPED | OUTPATIENT
Start: 2025-07-17 | End: 2026-07-17

## 2025-07-17 ASSESSMENT — PATIENT HEALTH QUESTIONNAIRE - PHQ9
2. FEELING DOWN, DEPRESSED OR HOPELESS: NOT AT ALL
SUM OF ALL RESPONSES TO PHQ9 QUESTIONS 1 AND 2: 0
1. LITTLE INTEREST OR PLEASURE IN DOING THINGS: NOT AT ALL

## 2025-07-18 DIAGNOSIS — E53.8 VITAMIN B12 DEFICIENCY: ICD-10-CM

## 2025-07-18 DIAGNOSIS — D50.9 IRON DEFICIENCY ANEMIA, UNSPECIFIED IRON DEFICIENCY ANEMIA TYPE: ICD-10-CM

## 2025-07-18 DIAGNOSIS — D64.9 ANEMIA, UNSPECIFIED TYPE: ICD-10-CM

## 2025-07-18 PROCEDURE — RXMED WILLOW AMBULATORY MEDICATION CHARGE

## 2025-07-18 RX ORDER — LANOLIN ALCOHOL/MO/W.PET/CERES
1000 CREAM (GRAM) TOPICAL DAILY
Qty: 100 TABLET | Refills: 0 | Status: SHIPPED | OUTPATIENT
Start: 2025-07-18

## 2025-07-18 RX ORDER — ASCORBIC ACID 500 MG
500 TABLET ORAL DAILY
Qty: 100 TABLET | Refills: 0 | Status: SHIPPED | OUTPATIENT
Start: 2025-07-18

## 2025-07-19 ENCOUNTER — PHARMACY VISIT (OUTPATIENT)
Dept: PHARMACY | Facility: CLINIC | Age: 60
End: 2025-07-19
Payer: COMMERCIAL

## 2025-08-07 ENCOUNTER — APPOINTMENT (OUTPATIENT)
Dept: PRIMARY CARE | Facility: CLINIC | Age: 60
End: 2025-08-07
Payer: COMMERCIAL

## 2025-08-22 ENCOUNTER — PHARMACY VISIT (OUTPATIENT)
Dept: PHARMACY | Facility: CLINIC | Age: 60
End: 2025-08-22
Payer: COMMERCIAL

## 2025-08-22 PROCEDURE — RXMED WILLOW AMBULATORY MEDICATION CHARGE
